# Patient Record
Sex: MALE | Race: WHITE | NOT HISPANIC OR LATINO | URBAN - METROPOLITAN AREA
[De-identification: names, ages, dates, MRNs, and addresses within clinical notes are randomized per-mention and may not be internally consistent; named-entity substitution may affect disease eponyms.]

---

## 2015-03-16 RX ORDER — CLONAZEPAM 1 MG
1 TABLET ORAL
Qty: 0 | Refills: 0 | DISCHARGE
Start: 2015-03-16

## 2015-12-05 RX ORDER — ISOSORBIDE MONONITRATE 60 MG/1
1 TABLET, EXTENDED RELEASE ORAL
Qty: 0 | Refills: 0 | DISCHARGE
Start: 2015-12-05

## 2015-12-05 RX ORDER — INSULIN GLARGINE 100 [IU]/ML
60 INJECTION, SOLUTION SUBCUTANEOUS
Qty: 0 | Refills: 0 | DISCHARGE
Start: 2015-12-05

## 2015-12-05 RX ORDER — INSULIN GLARGINE 100 [IU]/ML
28 INJECTION, SOLUTION SUBCUTANEOUS
Qty: 0 | Refills: 0 | DISCHARGE
Start: 2015-12-05

## 2017-03-13 ENCOUNTER — OUTPATIENT (OUTPATIENT)
Dept: OUTPATIENT SERVICES | Facility: HOSPITAL | Age: 62
LOS: 1 days | End: 2017-03-13
Payer: COMMERCIAL

## 2017-03-13 VITALS
WEIGHT: 235.01 LBS | SYSTOLIC BLOOD PRESSURE: 138 MMHG | RESPIRATION RATE: 16 BRPM | OXYGEN SATURATION: 97 % | HEIGHT: 70 IN | TEMPERATURE: 98 F | HEART RATE: 68 BPM | DIASTOLIC BLOOD PRESSURE: 86 MMHG

## 2017-03-13 DIAGNOSIS — Z95.1 PRESENCE OF AORTOCORONARY BYPASS GRAFT: Chronic | ICD-10-CM

## 2017-03-13 DIAGNOSIS — R93.1 ABNORMAL FINDINGS ON DIAGNOSTIC IMAGING OF HEART AND CORONARY CIRCULATION: ICD-10-CM

## 2017-03-13 DIAGNOSIS — Z95.5 PRESENCE OF CORONARY ANGIOPLASTY IMPLANT AND GRAFT: Chronic | ICD-10-CM

## 2017-03-13 LAB
ALBUMIN SERPL ELPH-MCNC: 4.4 G/DL — SIGNIFICANT CHANGE UP (ref 3.3–5)
ALP SERPL-CCNC: 62 U/L — SIGNIFICANT CHANGE UP (ref 40–120)
ALT FLD-CCNC: 59 U/L RC — HIGH (ref 10–45)
ANION GAP SERPL CALC-SCNC: 13 MMOL/L — SIGNIFICANT CHANGE UP (ref 5–17)
AST SERPL-CCNC: 34 U/L — SIGNIFICANT CHANGE UP (ref 10–40)
BILIRUB SERPL-MCNC: 0.7 MG/DL — SIGNIFICANT CHANGE UP (ref 0.2–1.2)
BUN SERPL-MCNC: 15 MG/DL — SIGNIFICANT CHANGE UP (ref 7–23)
CALCIUM SERPL-MCNC: 9 MG/DL — SIGNIFICANT CHANGE UP (ref 8.4–10.5)
CHLORIDE SERPL-SCNC: 103 MMOL/L — SIGNIFICANT CHANGE UP (ref 96–108)
CO2 SERPL-SCNC: 24 MMOL/L — SIGNIFICANT CHANGE UP (ref 22–31)
CREAT SERPL-MCNC: 0.71 MG/DL — SIGNIFICANT CHANGE UP (ref 0.5–1.3)
GLUCOSE SERPL-MCNC: 197 MG/DL — HIGH (ref 70–99)
HCT VFR BLD CALC: 47.1 % — SIGNIFICANT CHANGE UP (ref 39–50)
HGB BLD-MCNC: 16.5 G/DL — SIGNIFICANT CHANGE UP (ref 13–17)
MCHC RBC-ENTMCNC: 32.4 PG — SIGNIFICANT CHANGE UP (ref 27–34)
MCHC RBC-ENTMCNC: 35 GM/DL — SIGNIFICANT CHANGE UP (ref 32–36)
MCV RBC AUTO: 92.7 FL — SIGNIFICANT CHANGE UP (ref 80–100)
PLATELET # BLD AUTO: 157 K/UL — SIGNIFICANT CHANGE UP (ref 150–400)
POTASSIUM SERPL-MCNC: 4.5 MMOL/L — SIGNIFICANT CHANGE UP (ref 3.5–5.3)
POTASSIUM SERPL-SCNC: 4.5 MMOL/L — SIGNIFICANT CHANGE UP (ref 3.5–5.3)
PROT SERPL-MCNC: 7.4 G/DL — SIGNIFICANT CHANGE UP (ref 6–8.3)
RBC # BLD: 5.09 M/UL — SIGNIFICANT CHANGE UP (ref 4.2–5.8)
RBC # FLD: 12.8 % — SIGNIFICANT CHANGE UP (ref 10.3–14.5)
SODIUM SERPL-SCNC: 140 MMOL/L — SIGNIFICANT CHANGE UP (ref 135–145)
WBC # BLD: 11.9 K/UL — HIGH (ref 3.8–10.5)
WBC # FLD AUTO: 11.9 K/UL — HIGH (ref 3.8–10.5)

## 2017-03-13 PROCEDURE — 93010 ELECTROCARDIOGRAM REPORT: CPT

## 2017-03-13 PROCEDURE — 93455 CORONARY ART/GRFT ANGIO S&I: CPT | Mod: 26,GC

## 2017-03-13 PROCEDURE — 85027 COMPLETE CBC AUTOMATED: CPT

## 2017-03-13 PROCEDURE — C1769: CPT

## 2017-03-13 PROCEDURE — 93005 ELECTROCARDIOGRAM TRACING: CPT

## 2017-03-13 PROCEDURE — 93567 NJX CAR CTH SPRVLV AORTGRPHY: CPT

## 2017-03-13 PROCEDURE — C1887: CPT

## 2017-03-13 PROCEDURE — 93455 CORONARY ART/GRFT ANGIO S&I: CPT

## 2017-03-13 PROCEDURE — 93567 NJX CAR CTH SPRVLV AORTGRPHY: CPT | Mod: GC

## 2017-03-13 PROCEDURE — C1894: CPT

## 2017-03-13 PROCEDURE — 80053 COMPREHEN METABOLIC PANEL: CPT

## 2017-03-13 NOTE — H&P CARDIOLOGY - HISTORY OF PRESENT ILLNESS
61 y/o male with PMHx of CAD, CABG x 2, Stents x 16, HTN, DM type 2, HLD  presents with c/o occasional chest pain & SOB. Patient  reports to have  abnormal stress test ( result not available). Seen & evaluated by cardiologist, Dr. Caputo & recommends for cardiac cath.

## 2017-03-13 NOTE — H&P CARDIOLOGY - PMH
CAD (coronary artery disease)    Diabetes  type 2, controlled, A1c: 8  HLD (hyperlipidemia)    HTN (hypertension)    MI (myocardial infarction)    Stented coronary artery

## 2017-03-13 NOTE — H&P CARDIOLOGY - PSH
S/P CABG x 2  1994 in [place]  S/P CABG x 3  2004, [facility]  S/P drug eluting coronary stent placement  x16

## 2019-05-14 PROBLEM — E11.9 TYPE 2 DIABETES MELLITUS WITHOUT COMPLICATIONS: Chronic | Status: ACTIVE | Noted: 2017-03-13

## 2019-05-15 ENCOUNTER — INPATIENT (INPATIENT)
Facility: HOSPITAL | Age: 64
LOS: 0 days | Discharge: HOME | DRG: 247 | End: 2019-05-16
Attending: INTERNAL MEDICINE | Admitting: INTERNAL MEDICINE
Payer: MEDICARE

## 2019-05-15 ENCOUNTER — TRANSCRIPTION ENCOUNTER (OUTPATIENT)
Age: 64
End: 2019-05-15

## 2019-05-15 VITALS
WEIGHT: 244.93 LBS | HEART RATE: 84 BPM | HEIGHT: 70 IN | TEMPERATURE: 98 F | DIASTOLIC BLOOD PRESSURE: 67 MMHG | SYSTOLIC BLOOD PRESSURE: 118 MMHG | RESPIRATION RATE: 16 BRPM | OXYGEN SATURATION: 97 %

## 2019-05-15 DIAGNOSIS — Z95.1 PRESENCE OF AORTOCORONARY BYPASS GRAFT: Chronic | ICD-10-CM

## 2019-05-15 DIAGNOSIS — Z95.5 PRESENCE OF CORONARY ANGIOPLASTY IMPLANT AND GRAFT: Chronic | ICD-10-CM

## 2019-05-15 DIAGNOSIS — I48.91 UNSPECIFIED ATRIAL FIBRILLATION: ICD-10-CM

## 2019-05-15 LAB
ANION GAP SERPL CALC-SCNC: 14 MMOL/L — SIGNIFICANT CHANGE UP (ref 5–17)
BUN SERPL-MCNC: 23 MG/DL — SIGNIFICANT CHANGE UP (ref 7–23)
CALCIUM SERPL-MCNC: 9.8 MG/DL — SIGNIFICANT CHANGE UP (ref 8.4–10.5)
CHLORIDE SERPL-SCNC: 99 MMOL/L — SIGNIFICANT CHANGE UP (ref 96–108)
CO2 SERPL-SCNC: 22 MMOL/L — SIGNIFICANT CHANGE UP (ref 22–31)
CREAT SERPL-MCNC: 0.9 MG/DL — SIGNIFICANT CHANGE UP (ref 0.5–1.3)
GLUCOSE BLDC GLUCOMTR-MCNC: 114 MG/DL — HIGH (ref 70–99)
GLUCOSE BLDC GLUCOMTR-MCNC: 133 MG/DL — HIGH (ref 70–99)
GLUCOSE BLDC GLUCOMTR-MCNC: 207 MG/DL — HIGH (ref 70–99)
GLUCOSE SERPL-MCNC: 164 MG/DL — HIGH (ref 70–99)
HBA1C BLD-MCNC: 7.5 % — HIGH (ref 4–5.6)
HCT VFR BLD CALC: 41.4 % — SIGNIFICANT CHANGE UP (ref 39–50)
HGB BLD-MCNC: 14.3 G/DL — SIGNIFICANT CHANGE UP (ref 13–17)
MCHC RBC-ENTMCNC: 32.9 PG — SIGNIFICANT CHANGE UP (ref 27–34)
MCHC RBC-ENTMCNC: 34.6 GM/DL — SIGNIFICANT CHANGE UP (ref 32–36)
MCV RBC AUTO: 95 FL — SIGNIFICANT CHANGE UP (ref 80–100)
PLATELET # BLD AUTO: 183 K/UL — SIGNIFICANT CHANGE UP (ref 150–400)
POTASSIUM SERPL-MCNC: 4.2 MMOL/L — SIGNIFICANT CHANGE UP (ref 3.5–5.3)
POTASSIUM SERPL-SCNC: 4.2 MMOL/L — SIGNIFICANT CHANGE UP (ref 3.5–5.3)
RBC # BLD: 4.36 M/UL — SIGNIFICANT CHANGE UP (ref 4.2–5.8)
RBC # FLD: 13.5 % — SIGNIFICANT CHANGE UP (ref 10.3–14.5)
SODIUM SERPL-SCNC: 135 MMOL/L — SIGNIFICANT CHANGE UP (ref 135–145)
WBC # BLD: 8.7 K/UL — SIGNIFICANT CHANGE UP (ref 3.8–10.5)
WBC # FLD AUTO: 8.7 K/UL — SIGNIFICANT CHANGE UP (ref 3.8–10.5)

## 2019-05-15 PROCEDURE — 93567 NJX CAR CTH SPRVLV AORTGRPHY: CPT | Mod: GC

## 2019-05-15 PROCEDURE — 93456 R HRT CORONARY ARTERY ANGIO: CPT | Mod: 26,59,GC

## 2019-05-15 PROCEDURE — 99152 MOD SED SAME PHYS/QHP 5/>YRS: CPT | Mod: GC

## 2019-05-15 PROCEDURE — 93010 ELECTROCARDIOGRAM REPORT: CPT

## 2019-05-15 PROCEDURE — 92928 PRQ TCAT PLMT NTRAC ST 1 LES: CPT | Mod: LM,GC

## 2019-05-15 PROCEDURE — 93010 ELECTROCARDIOGRAM REPORT: CPT | Mod: 77

## 2019-05-15 RX ORDER — APIXABAN 2.5 MG/1
5 TABLET, FILM COATED ORAL EVERY 12 HOURS
Refills: 0 | Status: DISCONTINUED | OUTPATIENT
Start: 2019-05-16 | End: 2019-05-16

## 2019-05-15 RX ORDER — INSULIN LISPRO 100/ML
VIAL (ML) SUBCUTANEOUS
Refills: 0 | Status: DISCONTINUED | OUTPATIENT
Start: 2019-05-15 | End: 2019-05-16

## 2019-05-15 RX ORDER — DEXTROSE 50 % IN WATER 50 %
12.5 SYRINGE (ML) INTRAVENOUS ONCE
Refills: 0 | Status: DISCONTINUED | OUTPATIENT
Start: 2019-05-15 | End: 2019-05-16

## 2019-05-15 RX ORDER — INSULIN LISPRO 100/ML
5 VIAL (ML) SUBCUTANEOUS
Refills: 0 | Status: DISCONTINUED | OUTPATIENT
Start: 2019-05-15 | End: 2019-05-16

## 2019-05-15 RX ORDER — DEXTROSE 50 % IN WATER 50 %
25 SYRINGE (ML) INTRAVENOUS ONCE
Refills: 0 | Status: DISCONTINUED | OUTPATIENT
Start: 2019-05-15 | End: 2019-05-16

## 2019-05-15 RX ORDER — INSULIN LISPRO 100/ML
VIAL (ML) SUBCUTANEOUS AT BEDTIME
Refills: 0 | Status: DISCONTINUED | OUTPATIENT
Start: 2019-05-15 | End: 2019-05-16

## 2019-05-15 RX ORDER — APIXABAN 2.5 MG/1
1 TABLET, FILM COATED ORAL
Qty: 0 | Refills: 0 | DISCHARGE
Start: 2019-05-15

## 2019-05-15 RX ORDER — GLUCAGON INJECTION, SOLUTION 0.5 MG/.1ML
1 INJECTION, SOLUTION SUBCUTANEOUS ONCE
Refills: 0 | Status: DISCONTINUED | OUTPATIENT
Start: 2019-05-15 | End: 2019-05-16

## 2019-05-15 RX ORDER — ASPIRIN/CALCIUM CARB/MAGNESIUM 324 MG
81 TABLET ORAL DAILY
Refills: 0 | Status: DISCONTINUED | OUTPATIENT
Start: 2019-05-15 | End: 2019-05-16

## 2019-05-15 RX ORDER — METFORMIN HYDROCHLORIDE 850 MG/1
1 TABLET ORAL
Qty: 0 | Refills: 0 | DISCHARGE

## 2019-05-15 RX ORDER — DILTIAZEM HCL 120 MG
120 CAPSULE, EXT RELEASE 24 HR ORAL DAILY
Refills: 0 | Status: DISCONTINUED | OUTPATIENT
Start: 2019-05-15 | End: 2019-05-16

## 2019-05-15 RX ORDER — DEXTROSE 50 % IN WATER 50 %
15 SYRINGE (ML) INTRAVENOUS ONCE
Refills: 0 | Status: DISCONTINUED | OUTPATIENT
Start: 2019-05-15 | End: 2019-05-16

## 2019-05-15 RX ORDER — POTASSIUM CHLORIDE 20 MEQ
20 PACKET (EA) ORAL DAILY
Refills: 0 | Status: DISCONTINUED | OUTPATIENT
Start: 2019-05-15 | End: 2019-05-16

## 2019-05-15 RX ORDER — SODIUM CHLORIDE 9 MG/ML
1000 INJECTION, SOLUTION INTRAVENOUS
Refills: 0 | Status: DISCONTINUED | OUTPATIENT
Start: 2019-05-15 | End: 2019-05-16

## 2019-05-15 RX ORDER — FENOFIBRATE,MICRONIZED 130 MG
145 CAPSULE ORAL DAILY
Refills: 0 | Status: DISCONTINUED | OUTPATIENT
Start: 2019-05-15 | End: 2019-05-16

## 2019-05-15 RX ORDER — CLOPIDOGREL BISULFATE 75 MG/1
75 TABLET, FILM COATED ORAL DAILY
Refills: 0 | Status: DISCONTINUED | OUTPATIENT
Start: 2019-05-16 | End: 2019-05-16

## 2019-05-15 RX ORDER — ATENOLOL 25 MG/1
25 TABLET ORAL DAILY
Refills: 0 | Status: DISCONTINUED | OUTPATIENT
Start: 2019-05-15 | End: 2019-05-16

## 2019-05-15 RX ORDER — LISINOPRIL 2.5 MG/1
10 TABLET ORAL DAILY
Refills: 0 | Status: DISCONTINUED | OUTPATIENT
Start: 2019-05-15 | End: 2019-05-16

## 2019-05-15 RX ORDER — CLONAZEPAM 1 MG
1 TABLET ORAL THREE TIMES A DAY
Refills: 0 | Status: DISCONTINUED | OUTPATIENT
Start: 2019-05-15 | End: 2019-05-16

## 2019-05-15 RX ORDER — SERTRALINE 25 MG/1
100 TABLET, FILM COATED ORAL DAILY
Refills: 0 | Status: DISCONTINUED | OUTPATIENT
Start: 2019-05-15 | End: 2019-05-16

## 2019-05-15 RX ORDER — NITROGLYCERIN 6.5 MG
1 CAPSULE, EXTENDED RELEASE ORAL
Qty: 0 | Refills: 0 | DISCHARGE

## 2019-05-15 RX ORDER — FUROSEMIDE 40 MG
40 TABLET ORAL DAILY
Refills: 0 | Status: DISCONTINUED | OUTPATIENT
Start: 2019-05-15 | End: 2019-05-16

## 2019-05-15 RX ORDER — INSULIN GLARGINE 100 [IU]/ML
48 INJECTION, SOLUTION SUBCUTANEOUS AT BEDTIME
Refills: 0 | Status: DISCONTINUED | OUTPATIENT
Start: 2019-05-15 | End: 2019-05-16

## 2019-05-15 RX ORDER — ATORVASTATIN CALCIUM 80 MG/1
80 TABLET, FILM COATED ORAL AT BEDTIME
Refills: 0 | Status: DISCONTINUED | OUTPATIENT
Start: 2019-05-15 | End: 2019-05-16

## 2019-05-15 RX ORDER — ISOSORBIDE MONONITRATE 60 MG/1
60 TABLET, EXTENDED RELEASE ORAL DAILY
Refills: 0 | Status: DISCONTINUED | OUTPATIENT
Start: 2019-05-15 | End: 2019-05-16

## 2019-05-15 RX ADMIN — Medication 1 MILLIGRAM(S): at 21:34

## 2019-05-15 RX ADMIN — INSULIN GLARGINE 48 UNIT(S): 100 INJECTION, SOLUTION SUBCUTANEOUS at 21:31

## 2019-05-15 RX ADMIN — SERTRALINE 100 MILLIGRAM(S): 25 TABLET, FILM COATED ORAL at 18:14

## 2019-05-15 RX ADMIN — ATORVASTATIN CALCIUM 80 MILLIGRAM(S): 80 TABLET, FILM COATED ORAL at 21:31

## 2019-05-15 RX ADMIN — Medication 120 MILLIGRAM(S): at 18:14

## 2019-05-15 RX ADMIN — Medication 5 UNIT(S): at 18:15

## 2019-05-15 RX ADMIN — Medication 145 MILLIGRAM(S): at 18:14

## 2019-05-15 NOTE — H&P CARDIOLOGY - HISTORY OF PRESENT ILLNESS
64 year old  male with pmhx of CAD with PCI x 16, double Coronary Bypass (1998, then triple Coronary Bypass ( 2004), prior MI, Afib on Eliquis (last taken 5/14/19), HTN, HLD FALGUNI on CPAP, and Type 2 DM presents with typical angina. The patient admits to experiencing midsternal chest pain at rest, worsening upon minimal exertion for the past 6 weeks. He states he has taken 4-6 sublingual nitro which did provide relief of the chest pain. He was referred for cath by his cardiologist Dr. Caputo. He currently denies syncope, dizziness, shortness of breath or palpitations.

## 2019-05-15 NOTE — CHART NOTE - NSCHARTNOTEFT_GEN_A_CORE
Patient underwent a PCI procedure and is being admitted as they are at increased risk for major adverse cardiac and vascular events if discharged due to the following high risk characteristics:      unstable angina    Pee Bolton, NP   x [phone]

## 2019-05-15 NOTE — H&P CARDIOLOGY - FAMILY HISTORY
Sibling  Still living? Yes, Estimated age: Age Unknown  Family history of CHF (congestive heart failure), Age at diagnosis: Age Unknown  Family history of hypertension, Age at diagnosis: Age Unknown     Child  Still living? Yes, Estimated age: Age Unknown  Family history of hypertension, Age at diagnosis: Age Unknown     Grandparent  Still living? No  Family history of CHF (congestive heart failure), Age at diagnosis: Age Unknown

## 2019-05-15 NOTE — CHART NOTE - NSCHARTNOTEFT_GEN_A_CORE
FEMORAL ANGIOSEAL ASSESSMENT NOTE    Right groin angioseal in place with good hemostatsis w/o any bleeding or hematoma  Pulses in the RIGHT lower extremity are palpable, ( right femoral/DP/PT pulses +2).  Right groin is soft/non tender,  Patient denies leg or foot pain, numbness, tingling,  or chest pain  post 12 lead EKG WNL, no ST or T wave changes noted when compared to pre procedural EKG    Complications: None    Comments: Bedrest for the next 2 hours as per protocol     Education: medication adherence, groin care, signs and symptoms of groin complications reviewed with pt who verbalizes understanding. All questions answered.       Pee Bolton, NP  x [phone]

## 2019-05-15 NOTE — DISCHARGE NOTE PROVIDER - NSDCCPTREATMENT_GEN_ALL_CORE_FT
PRINCIPAL PROCEDURE  Procedure: Left heart catheterization  Findings and Treatment: s/p LHC NORRIS x 1 LM (80%), pLAd , SVG to RPDA patent; SVG to  OM patent

## 2019-05-15 NOTE — H&P CARDIOLOGY - PMH
CAD (coronary artery disease)    Diabetes  type 2, controlled, A1c: 8  HLD (hyperlipidemia)    HTN (hypertension)    MI (myocardial infarction)    Sleep apnea, unspecified type    Stented coronary artery
English

## 2019-05-15 NOTE — DISCHARGE NOTE PROVIDER - CARE PROVIDER_API CALL
Eleno Wilson (MD)  Cardiovascular Disease; Internal Medicine  [address] [place] [address]  Phone: [phone]  Fax: [phone]  Follow Up Time:

## 2019-05-15 NOTE — DISCHARGE NOTE PROVIDER - NSDCCPCAREPLAN_GEN_ALL_CORE_FT
PRINCIPAL DISCHARGE DIAGNOSIS  Diagnosis: CAD (coronary artery disease)  Assessment and Plan of Treatment: Pt remains chest pain free and understands post cath discharge instructions   No heavy lifting, strenuous activity, bending, straining or unnecessary stair climbing  for 2 weeks. No sex for 1 week.  No driving for 2 days. You may shower 24 hours following procedure but avoid baths and swimming for 1 week. Check groin site for bleeding and/or swelling daily following procedure. Call your doctor/cardiologist immediately should it occur or if you have increased/persistent pain at the site. Follow up with your cardiologist in 1- 2 weeks. You may call [facility] at [phone] or [phone] after office hours and weekends  with any questions or concerns following your procedure. Take medications as prescribed.

## 2019-05-15 NOTE — CHART NOTE - NSCHARTNOTEFT_GEN_A_CORE
TRIPLE THERAPY    Pt to take ASA/PLAVIX/ELIQUIS x 1 month, then ASPIRIN to be stopped  and Eliquis and Plavix to continue as discussed with Dr Dyer.    Pee Bolton, NP  x  [phone]

## 2019-05-15 NOTE — DISCHARGE NOTE PROVIDER - NSDCFUADDINST_GEN_ALL_CORE_FT
Aspiri, PLAVIX and ELIQUIS x 1 month  After one months discontinue  aspirin   Continue with Plavix and Eliquis

## 2019-05-15 NOTE — DISCHARGE NOTE PROVIDER - HOSPITAL COURSE
64 year old  male with pmhx of CAD with PCI x 16, double Coronary Bypass (1998, then triple Coronary Bypass ( 2004), prior MI, Afib on Eliquis (last taken 5/14/19), HTN, HLD FALGUNI on CPAP, and Type 2 DM presents with typical angina. The patient admits to experiencing midsternal chest pain at rest, worsening upon minimal exertion for the past 6 weeks. He states he has taken 4-6 sublingual nitro which did provide relief of the chest pain. He was referred for cath by his cardiologist Dr. Caputo. He currently denies syncope, dizziness, shortness of breath or palpitations. Pt is now slp [facility] NORRIS x 1 LM  (80%) via RFA, SVG-RPDA patent, pLAD , SVG to OM patent

## 2019-05-16 ENCOUNTER — TRANSCRIPTION ENCOUNTER (OUTPATIENT)
Age: 64
End: 2019-05-16

## 2019-05-16 VITALS
OXYGEN SATURATION: 98 % | RESPIRATION RATE: 18 BRPM | HEART RATE: 78 BPM | SYSTOLIC BLOOD PRESSURE: 103 MMHG | DIASTOLIC BLOOD PRESSURE: 78 MMHG

## 2019-05-16 LAB
ANION GAP SERPL CALC-SCNC: 12 MMOL/L — SIGNIFICANT CHANGE UP (ref 5–17)
BUN SERPL-MCNC: 22 MG/DL — SIGNIFICANT CHANGE UP (ref 7–23)
CALCIUM SERPL-MCNC: 9.3 MG/DL — SIGNIFICANT CHANGE UP (ref 8.4–10.5)
CHLORIDE SERPL-SCNC: 106 MMOL/L — SIGNIFICANT CHANGE UP (ref 96–108)
CO2 SERPL-SCNC: 19 MMOL/L — LOW (ref 22–31)
CREAT SERPL-MCNC: 0.82 MG/DL — SIGNIFICANT CHANGE UP (ref 0.5–1.3)
GLUCOSE SERPL-MCNC: 176 MG/DL — HIGH (ref 70–99)
HCT VFR BLD CALC: 39.6 % — SIGNIFICANT CHANGE UP (ref 39–50)
HGB BLD-MCNC: 13.7 G/DL — SIGNIFICANT CHANGE UP (ref 13–17)
MCHC RBC-ENTMCNC: 32.8 PG — SIGNIFICANT CHANGE UP (ref 27–34)
MCHC RBC-ENTMCNC: 34.6 GM/DL — SIGNIFICANT CHANGE UP (ref 32–36)
MCV RBC AUTO: 94.9 FL — SIGNIFICANT CHANGE UP (ref 80–100)
PLATELET # BLD AUTO: 167 K/UL — SIGNIFICANT CHANGE UP (ref 150–400)
POTASSIUM SERPL-MCNC: 4 MMOL/L — SIGNIFICANT CHANGE UP (ref 3.5–5.3)
POTASSIUM SERPL-SCNC: 4 MMOL/L — SIGNIFICANT CHANGE UP (ref 3.5–5.3)
RBC # BLD: 4.17 M/UL — LOW (ref 4.2–5.8)
RBC # FLD: 13.6 % — SIGNIFICANT CHANGE UP (ref 10.3–14.5)
SODIUM SERPL-SCNC: 137 MMOL/L — SIGNIFICANT CHANGE UP (ref 135–145)
WBC # BLD: 8.7 K/UL — SIGNIFICANT CHANGE UP (ref 3.8–10.5)
WBC # FLD AUTO: 8.7 K/UL — SIGNIFICANT CHANGE UP (ref 3.8–10.5)

## 2019-05-16 PROCEDURE — 93005 ELECTROCARDIOGRAM TRACING: CPT

## 2019-05-16 PROCEDURE — C1769: CPT

## 2019-05-16 PROCEDURE — 93567 NJX CAR CTH SPRVLV AORTGRPHY: CPT

## 2019-05-16 PROCEDURE — 82962 GLUCOSE BLOOD TEST: CPT

## 2019-05-16 PROCEDURE — 93456 R HRT CORONARY ARTERY ANGIO: CPT | Mod: 59

## 2019-05-16 PROCEDURE — 99153 MOD SED SAME PHYS/QHP EA: CPT

## 2019-05-16 PROCEDURE — 85027 COMPLETE CBC AUTOMATED: CPT

## 2019-05-16 PROCEDURE — 83036 HEMOGLOBIN GLYCOSYLATED A1C: CPT

## 2019-05-16 PROCEDURE — 99238 HOSP IP/OBS DSCHRG MGMT 30/<: CPT

## 2019-05-16 PROCEDURE — 80048 BASIC METABOLIC PNL TOTAL CA: CPT

## 2019-05-16 PROCEDURE — C1725: CPT

## 2019-05-16 PROCEDURE — C1894: CPT

## 2019-05-16 PROCEDURE — C1887: CPT

## 2019-05-16 PROCEDURE — C1760: CPT

## 2019-05-16 PROCEDURE — C9600: CPT | Mod: LM

## 2019-05-16 PROCEDURE — 99152 MOD SED SAME PHYS/QHP 5/>YRS: CPT

## 2019-05-16 PROCEDURE — C1874: CPT

## 2019-05-16 RX ORDER — CLOPIDOGREL BISULFATE 75 MG/1
1 TABLET, FILM COATED ORAL
Qty: 30 | Refills: 11
Start: 2019-05-16 | End: 2020-05-09

## 2019-05-16 RX ADMIN — CLOPIDOGREL BISULFATE 75 MILLIGRAM(S): 75 TABLET, FILM COATED ORAL at 05:42

## 2019-05-16 RX ADMIN — Medication 81 MILLIGRAM(S): at 05:42

## 2019-05-16 RX ADMIN — APIXABAN 5 MILLIGRAM(S): 2.5 TABLET, FILM COATED ORAL at 05:43

## 2019-05-16 NOTE — PROGRESS NOTE ADULT - SUBJECTIVE AND OBJECTIVE BOX
64y old  Male who presents with a chief complaint of CAD (15 May 2019 20:20)now s/p C LM NORRIS x 1 (80%); SVG to RPDA patent; SVG to OM patent, pLAD   via RFA access            Allergies    morphine (Rash)    Intolerances        Medications:  apixaban 5 milliGRAM(s) Oral every 12 hours  aspirin enteric coated 81 milliGRAM(s) Oral daily  ATENolol  Tablet 25 milliGRAM(s) Oral daily  atorvastatin 80 milliGRAM(s) Oral at bedtime  clonazePAM  Tablet 1 milliGRAM(s) Oral three times a day PRN  clopidogrel Tablet 75 milliGRAM(s) Oral daily  dextrose 40% Gel 15 Gram(s) Oral once PRN  dextrose 5%. 1000 milliLiter(s) IV Continuous <Continuous>  dextrose 50% Injectable 12.5 Gram(s) IV Push once  dextrose 50% Injectable 25 Gram(s) IV Push once  dextrose 50% Injectable 25 Gram(s) IV Push once  diltiazem    milliGRAM(s) Oral daily  fenofibrate Tablet 145 milliGRAM(s) Oral daily  furosemide    Tablet 40 milliGRAM(s) Oral daily  glucagon  Injectable 1 milliGRAM(s) IntraMuscular once PRN  insulin glargine Injectable (LANTUS) 48 Unit(s) SubCutaneous at bedtime  insulin lispro (HumaLOG) corrective regimen sliding scale   SubCutaneous three times a day before meals  insulin lispro (HumaLOG) corrective regimen sliding scale   SubCutaneous at bedtime  insulin lispro Injectable (HumaLOG) 5 Unit(s) SubCutaneous three times a day before meals  isosorbide   mononitrate ER Tablet (IMDUR) 60 milliGRAM(s) Oral daily  lisinopril 10 milliGRAM(s) Oral daily  potassium chloride    Tablet ER 20 milliEquivalent(s) Oral daily  sertraline 100 milliGRAM(s) Oral daily      Vitals:  T(C): 36.5 (05-15-19 @ 19:55), Max: 37 (05-15-19 @ 14:00)  HR: 82 (05-15-19 @ 19:55) (71 - 91)  BP: 102/65 (05-15-19 @ 19:55) (87/68 - 123/80)  BP(mean): 84 (05-15-19 @ 11:20) (84 - 84)  RR: 18 (05-15-19 @ 19:55) (16 - 18)  SpO2: 98% (05-15-19 @ 19:55) (95% - 98%)  Wt(kg): --  Daily Height in cm: 177.8 (15 May 2019 11:20)    Daily Weight in k.1 (15 May 2019 11:20)  I&O's Summary        Physical Exam:  Appearance: Normal  Procedural Access Site: RFA access. No hematoma, Non-tender to palpation, 2+ pulse, No bruit, No Ecchymosis  Psychiatry: AAOx3, Mood & affect appropriate  Skin: No rashes, No ecchymoses, No cyanosis    05-15    135  |  99  |  23  ----------------------------<  164<H>  4.2   |  22  |  0.90    Ca    9.8      15 May 2019 11:10      Interpretation of Telemetry:

## 2019-05-16 NOTE — CONSULT NOTE ADULT - SUBJECTIVE AND OBJECTIVE BOX
CHIEF COMPLAINT: chest pain    HISTORY OF PRESENT ILLNESS:  64 year old  male with pmhx of CAD with PCI x 16, double Coronary Bypass (1998, then triple Coronary Bypass ( 2004), prior MI, Afib on Eliquis (last taken 5/14/19), HTN, HLD FALGUNI on CPAP, and Type 2 DM presents with typical angina. The patient admits to experiencing midsternal chest pain at rest, worsening upon minimal exertion for the past 6 weeks. He states he has taken 4-6 sublingual nitro which did provide relief of the chest pain. He was referred for cath by his cardiologist Dr. Caputo. He currently denies syncope, dizziness, shortness of breath or palpitations.      Allergies  morphine (Rash)        MEDICATIONS:  apixaban 5 milliGRAM(s) Oral every 12 hours  aspirin enteric coated 81 milliGRAM(s) Oral daily  ATENolol  Tablet 25 milliGRAM(s) Oral daily  clopidogrel Tablet 75 milliGRAM(s) Oral daily  diltiazem    milliGRAM(s) Oral daily  furosemide    Tablet 40 milliGRAM(s) Oral daily  isosorbide   mononitrate ER Tablet (IMDUR) 60 milliGRAM(s) Oral daily  lisinopril 10 milliGRAM(s) Oral daily  clonazePAM  Tablet 1 milliGRAM(s) Oral three times a day PRN  sertraline 100 milliGRAM(s) Oral daily  atorvastatin 80 milliGRAM(s) Oral at bedtime  dextrose 40% Gel 15 Gram(s) Oral once PRN  dextrose 50% Injectable 12.5 Gram(s) IV Push once  dextrose 50% Injectable 25 Gram(s) IV Push once  dextrose 50% Injectable 25 Gram(s) IV Push once  fenofibrate Tablet 145 milliGRAM(s) Oral daily  glucagon  Injectable 1 milliGRAM(s) IntraMuscular once PRN  insulin glargine Injectable (LANTUS) 48 Unit(s) SubCutaneous at bedtime  insulin lispro (HumaLOG) corrective regimen sliding scale   SubCutaneous three times a day before meals  insulin lispro (HumaLOG) corrective regimen sliding scale   SubCutaneous at bedtime  insulin lispro Injectable (HumaLOG) 5 Unit(s) SubCutaneous three times a day before meals  dextrose 5%. 1000 milliLiter(s) IV Continuous <Continuous>  potassium chloride    Tablet ER 20 milliEquivalent(s) Oral daily      PAST MEDICAL & SURGICAL HISTORY:  Sleep apnea, unspecified type  Diabetes: type 2, controlled, A1c: 8  HLD (hyperlipidemia)  HTN (hypertension)  Stented coronary artery  CAD (coronary artery disease)  MI (myocardial infarction)  S/P drug eluting coronary stent placement: x16  S/P CABG x 3: 2004, [facility]  S/P CABG x 2: 1994 in [place]      FAMILY HISTORY:  Family history of CHF (congestive heart failure) (Grandparent)  Family history of hypertension (Sibling, Child)  Family history of CHF (congestive heart failure) (Sibling)      SOCIAL HISTORY:    nonsmoker. independent in adl    REVIEW OF SYSTEMS:  See HPI, otherwise complete 10 point review of systems negative    [ ] All others negative	      PHYSICAL EXAM:  T(C): 36.5 (05-16-19 @ 04:23), Max: 37 (05-15-19 @ 14:00)  HR: 78 (05-16-19 @ 06:59) (71 - 91)  BP: 103/78 (05-16-19 @ 06:59) (87/68 - 123/80)  RR: 18 (05-16-19 @ 06:59) (16 - 18)  SpO2: 98% (05-16-19 @ 06:59) (95% - 98%)  Wt(kg): --  I&O's Summary    15 May 2019 07:01  -  16 May 2019 07:00  --------------------------------------------------------  IN: 180 mL / OUT: 0 mL / NET: 180 mL        Appearance: No Acute Distress	  HEENT:  Normal oral mucosa, PERRL, EOMI	  Cardiovascular: Normal S1 S2, No JVD, No murmurs/rubs/gallops  Respiratory: Lungs clear to auscultation bilaterally  Gastrointestinal:  Soft, Non-tender, + BS	  Skin: No rashes, No ecchymoses, No cyanosis	  Neurologic: Non-focal  Extremities: No clubbing, cyanosis or edema  Vascular: Peripheral pulses palpable 2+ bilaterally  Psychiatry: A & O x 3, Mood & affect appropriate    Laboratory Data:	 	    CBC Full  -  ( 16 May 2019 02:45 )  WBC Count : 8.7 K/uL  Hemoglobin : 13.7 g/dL  Hematocrit : 39.6 %  Platelet Count - Automated : 167 K/uL  Mean Cell Volume : 94.9 fl  Mean Cell Hemoglobin : 32.8 pg  Mean Cell Hemoglobin Concentration : 34.6 gm/dL  Auto Neutrophil # : x  Auto Lymphocyte # : x  Auto Monocyte # : x  Auto Eosinophil # : x  Auto Basophil # : x  Auto Neutrophil % : x  Auto Lymphocyte % : x  Auto Monocyte % : x  Auto Eosinophil % : x  Auto Basophil % : x    05-16    137  |  106  |  22  ----------------------------<  176<H>  4.0   |  19<L>  |  0.82  05-15    135  |  99  |  23  ----------------------------<  164<H>  4.2   |  22  |  0.90    Ca    9.3      16 May 2019 02:45  Ca    9.8      15 May 2019 11:10        proBNP:   Lipid Profile:   HgA1c: Hemoglobin A1C, Whole Blood: 7.5 % (05-15 @ 16:53)        Interpretation of Telemetry: 	af rate controlled    ECG:  	af 70s no acute ischemic changes    C:  5/15/19: LM NORRIS x 1 (80%)  SVG to RPDA patent   pLAD  SVG to OM patent  right fem artery angioseal        Assessment:  -atrial fibrillation  -CAD s/p CABG  -unstable angina  -ischemic cardiomyopathy  -DM  -FALGUNI on CPAP    Recs:  -appreciate excellent care under dr jamila cohen  -c/w dapt and statin. per discussion with dr cohen, would hold asa after 1 mo to avoid triple therapy. after 3 months would transition from plavix to asa per dr cohen recs  -c/w eliquis and dilt for afib  -c/w lasix. appears euvolemic  -dispo planning            Greater than 60 minutes spent on total encounter; more than 50% of the visit was spent counseling and/or coordinating care by the attending physician.   	  Zander Wilson MD   Cardiovascular Diseases  [phone]

## 2019-05-16 NOTE — DISCHARGE NOTE NURSING/CASE MANAGEMENT/SOCIAL WORK - NSDCDPATPORTLINK_GEN_ALL_CORE
You can access the [facility], offered by [facility], by registering with the following website: [email or web address]

## 2019-05-16 NOTE — PROGRESS NOTE ADULT - ASSESSMENT
64y old  Male who presents with a chief complaint of CAD (15 May 2019 20:20)now s/p [facility] LM NORRIS x 1 (80%); SVG to RPDA patent; SVG to OM patent, pLAD   via RFA access.  Pt tolerated the procedure well, cardiac cath site benign. Post-procedure discharge instructions discussed and questions addressed

## 2019-06-06 ENCOUNTER — TRANSCRIPTION ENCOUNTER (OUTPATIENT)
Age: 64
End: 2019-06-06

## 2019-08-22 PROBLEM — G47.30 SLEEP APNEA, UNSPECIFIED: Chronic | Status: ACTIVE | Noted: 2019-05-15

## 2019-08-23 ENCOUNTER — OUTPATIENT (OUTPATIENT)
Dept: OUTPATIENT SERVICES | Facility: HOSPITAL | Age: 64
LOS: 1 days | End: 2019-08-23
Payer: MEDICARE

## 2019-08-23 VITALS
RESPIRATION RATE: 18 BRPM | HEIGHT: 70 IN | WEIGHT: 240.08 LBS | DIASTOLIC BLOOD PRESSURE: 72 MMHG | SYSTOLIC BLOOD PRESSURE: 131 MMHG | OXYGEN SATURATION: 96 % | TEMPERATURE: 98 F | HEART RATE: 86 BPM

## 2019-08-23 DIAGNOSIS — R94.39 ABNORMAL RESULT OF OTHER CARDIOVASCULAR FUNCTION STUDY: ICD-10-CM

## 2019-08-23 DIAGNOSIS — Z95.5 PRESENCE OF CORONARY ANGIOPLASTY IMPLANT AND GRAFT: Chronic | ICD-10-CM

## 2019-08-23 DIAGNOSIS — Z95.1 PRESENCE OF AORTOCORONARY BYPASS GRAFT: Chronic | ICD-10-CM

## 2019-08-23 LAB
ALBUMIN SERPL ELPH-MCNC: 4.5 G/DL — SIGNIFICANT CHANGE UP (ref 3.3–5)
ALP SERPL-CCNC: 45 U/L — SIGNIFICANT CHANGE UP (ref 40–120)
ALT FLD-CCNC: 26 U/L — SIGNIFICANT CHANGE UP (ref 10–45)
ANION GAP SERPL CALC-SCNC: 12 MMOL/L — SIGNIFICANT CHANGE UP (ref 5–17)
AST SERPL-CCNC: 21 U/L — SIGNIFICANT CHANGE UP (ref 10–40)
BILIRUB SERPL-MCNC: 0.6 MG/DL — SIGNIFICANT CHANGE UP (ref 0.2–1.2)
BUN SERPL-MCNC: 24 MG/DL — HIGH (ref 7–23)
CALCIUM SERPL-MCNC: 10 MG/DL — SIGNIFICANT CHANGE UP (ref 8.4–10.5)
CHLORIDE SERPL-SCNC: 103 MMOL/L — SIGNIFICANT CHANGE UP (ref 96–108)
CO2 SERPL-SCNC: 22 MMOL/L — SIGNIFICANT CHANGE UP (ref 22–31)
CREAT SERPL-MCNC: 0.92 MG/DL — SIGNIFICANT CHANGE UP (ref 0.5–1.3)
GLUCOSE BLDC GLUCOMTR-MCNC: 115 MG/DL — HIGH (ref 70–99)
GLUCOSE SERPL-MCNC: 164 MG/DL — HIGH (ref 70–99)
HCT VFR BLD CALC: 45.8 % — SIGNIFICANT CHANGE UP (ref 39–50)
HGB BLD-MCNC: 14.8 G/DL — SIGNIFICANT CHANGE UP (ref 13–17)
MCHC RBC-ENTMCNC: 30.1 PG — SIGNIFICANT CHANGE UP (ref 27–34)
MCHC RBC-ENTMCNC: 32.4 GM/DL — SIGNIFICANT CHANGE UP (ref 32–36)
MCV RBC AUTO: 92.9 FL — SIGNIFICANT CHANGE UP (ref 80–100)
PLATELET # BLD AUTO: 151 K/UL — SIGNIFICANT CHANGE UP (ref 150–400)
POTASSIUM SERPL-MCNC: 4.3 MMOL/L — SIGNIFICANT CHANGE UP (ref 3.5–5.3)
POTASSIUM SERPL-SCNC: 4.3 MMOL/L — SIGNIFICANT CHANGE UP (ref 3.5–5.3)
PROT SERPL-MCNC: 8 G/DL — SIGNIFICANT CHANGE UP (ref 6–8.3)
RBC # BLD: 4.93 M/UL — SIGNIFICANT CHANGE UP (ref 4.2–5.8)
RBC # FLD: 13.5 % — SIGNIFICANT CHANGE UP (ref 10.3–14.5)
SODIUM SERPL-SCNC: 137 MMOL/L — SIGNIFICANT CHANGE UP (ref 135–145)
WBC # BLD: 8.3 K/UL — SIGNIFICANT CHANGE UP (ref 3.8–10.5)
WBC # FLD AUTO: 8.3 K/UL — SIGNIFICANT CHANGE UP (ref 3.8–10.5)

## 2019-08-23 PROCEDURE — C1894: CPT

## 2019-08-23 PROCEDURE — 99152 MOD SED SAME PHYS/QHP 5/>YRS: CPT

## 2019-08-23 PROCEDURE — 82962 GLUCOSE BLOOD TEST: CPT

## 2019-08-23 PROCEDURE — 93455 CORONARY ART/GRFT ANGIO S&I: CPT

## 2019-08-23 PROCEDURE — 93567 NJX CAR CTH SPRVLV AORTGRPHY: CPT

## 2019-08-23 PROCEDURE — 93005 ELECTROCARDIOGRAM TRACING: CPT

## 2019-08-23 PROCEDURE — 85027 COMPLETE CBC AUTOMATED: CPT

## 2019-08-23 PROCEDURE — 93567 NJX CAR CTH SPRVLV AORTGRPHY: CPT | Mod: GC

## 2019-08-23 PROCEDURE — 93455 CORONARY ART/GRFT ANGIO S&I: CPT | Mod: 26,GC

## 2019-08-23 PROCEDURE — 93010 ELECTROCARDIOGRAM REPORT: CPT

## 2019-08-23 PROCEDURE — C1887: CPT

## 2019-08-23 PROCEDURE — 80053 COMPREHEN METABOLIC PANEL: CPT

## 2019-08-23 PROCEDURE — C1760: CPT

## 2019-08-23 PROCEDURE — 99152 MOD SED SAME PHYS/QHP 5/>YRS: CPT | Mod: GC

## 2019-08-23 PROCEDURE — C1769: CPT

## 2019-08-23 RX ORDER — DULAGLUTIDE 4.5 MG/.5ML
1 INJECTION, SOLUTION SUBCUTANEOUS
Qty: 0 | Refills: 0 | DISCHARGE

## 2019-08-23 RX ORDER — ATENOLOL 25 MG/1
1 TABLET ORAL
Qty: 0 | Refills: 0 | DISCHARGE

## 2019-08-23 RX ORDER — DILTIAZEM HCL 120 MG
1 CAPSULE, EXT RELEASE 24 HR ORAL
Qty: 0 | Refills: 0 | DISCHARGE

## 2019-08-23 NOTE — H&P CARDIOLOGY - RS GEN PE MLT RESP DETAILS PC
diminished breath sounds, L/breath sounds equal/good air movement/airway patent/diminished breath sounds, R

## 2019-08-23 NOTE — H&P CARDIOLOGY - PMH
CAD (coronary artery disease)    Diabetes  type 2, controlled, A1c: 8  HLD (hyperlipidemia)    HTN (hypertension)    MI (myocardial infarction)    Sleep apnea, unspecified type    Stented coronary artery

## 2019-08-23 NOTE — H&P CARDIOLOGY - HISTORY OF PRESENT ILLNESS
65 y/o  male, former cigarette smoker x 60 PY with PMHx of MI, Afib on Eliquis, HTN, HLD FALGUNI on CPAP (uses intermittently), T2DM on insulin A1C 7.5 in 5/2019, and CAD s/p CABG x 2 in 1994 and CABG x 3 in 2004 and multiple subsequent PCI's, 17 stents total to date last 5/2019 NORRIS x 1 to Left Main offering c/o continued intermittent exertional CP and dyspnea (relieved with SL NTG).  Patient had NST on 8/21/2019 showing moderate sized area of severe anterior septal ischemia, small apical infarction with mild periinfarction ischemia, LVEF 50%, and apical hypokinesis.  Patient is now for [facility] for which he presents today. 65 y/o  male, former cigarette smoker x 60 PY with PMHx of COPD, MI, Afib on Eliquis, HTN, HLD FALGUNI on CPAP (uses intermittently), T2DM on insulin A1C 7.5 in 5/2019, and CAD s/p CABG x 2 in 1994 and CABG x 3 in 2004 and multiple subsequent PCI's, 17 stents total to date last 5/2019 NORRIS x 1 to Left Main offering c/o continued intermittent exertional CP and dyspnea (relieved with SL NTG).  Patient had NST on 8/21/2019 showing moderate sized area of severe anterior septal ischemia, small apical infarction with mild periinfarction ischemia, LVEF 50%, and apical hypokinesis.  Patient is now for [facility] for which he presents today.

## 2021-02-20 ENCOUNTER — EMERGENCY (EMERGENCY)
Facility: HOSPITAL | Age: 66
LOS: 1 days | Discharge: HOME | End: 2021-02-20
Attending: EMERGENCY MEDICINE | Admitting: EMERGENCY MEDICINE
Payer: MEDICARE

## 2021-02-20 VITALS
RESPIRATION RATE: 18 BRPM | HEART RATE: 78 BPM | DIASTOLIC BLOOD PRESSURE: 61 MMHG | OXYGEN SATURATION: 98 % | TEMPERATURE: 97 F | HEIGHT: 70 IN | SYSTOLIC BLOOD PRESSURE: 132 MMHG

## 2021-02-20 DIAGNOSIS — Z20.822 CONTACT WITH AND (SUSPECTED) EXPOSURE TO COVID-19: ICD-10-CM

## 2021-02-20 DIAGNOSIS — Z95.1 PRESENCE OF AORTOCORONARY BYPASS GRAFT: Chronic | ICD-10-CM

## 2021-02-20 DIAGNOSIS — Z95.5 PRESENCE OF CORONARY ANGIOPLASTY IMPLANT AND GRAFT: Chronic | ICD-10-CM

## 2021-02-20 PROCEDURE — 99282 EMERGENCY DEPT VISIT SF MDM: CPT

## 2021-02-20 NOTE — ED PROVIDER NOTE - PATIENT PORTAL LINK FT
You can access the FollowMyHealth Patient Portal offered by [facility] by registering at the following website: [email or web address]. By joining [employer]’s FollowMyHealth portal, you will also be able to view your health information using other applications (apps) compatible with our system.

## 2021-02-20 NOTE — ED PROVIDER NOTE - OBJECTIVE STATEMENT
65 y/o M presents to the ED requesting to have testing done for COVID-19. Pt endorses he is asymptomatic at this time. Pt denies fevers, chills, coughs, CP, and SOB.

## 2021-02-21 LAB — SARS-COV-2 RNA SPEC QL NAA+PROBE: SIGNIFICANT CHANGE UP

## 2021-02-23 ENCOUNTER — OUTPATIENT (OUTPATIENT)
Dept: OUTPATIENT SERVICES | Facility: HOSPITAL | Age: 66
LOS: 1 days | End: 2021-02-23
Payer: MEDICARE

## 2021-02-23 VITALS
WEIGHT: 225.97 LBS | HEART RATE: 75 BPM | TEMPERATURE: 98 F | RESPIRATION RATE: 16 BRPM | HEIGHT: 70 IN | OXYGEN SATURATION: 97 % | SYSTOLIC BLOOD PRESSURE: 103 MMHG | DIASTOLIC BLOOD PRESSURE: 66 MMHG

## 2021-02-23 VITALS
HEART RATE: 76 BPM | DIASTOLIC BLOOD PRESSURE: 57 MMHG | SYSTOLIC BLOOD PRESSURE: 102 MMHG | RESPIRATION RATE: 18 BRPM | OXYGEN SATURATION: 97 %

## 2021-02-23 DIAGNOSIS — Z95.1 PRESENCE OF AORTOCORONARY BYPASS GRAFT: Chronic | ICD-10-CM

## 2021-02-23 DIAGNOSIS — I20.0 UNSTABLE ANGINA: ICD-10-CM

## 2021-02-23 DIAGNOSIS — Z95.5 PRESENCE OF CORONARY ANGIOPLASTY IMPLANT AND GRAFT: Chronic | ICD-10-CM

## 2021-02-23 LAB
ANION GAP SERPL CALC-SCNC: 11 MMOL/L — SIGNIFICANT CHANGE UP (ref 5–17)
BASOPHILS # BLD AUTO: 0.08 K/UL — SIGNIFICANT CHANGE UP (ref 0–0.2)
BASOPHILS NFR BLD AUTO: 0.8 % — SIGNIFICANT CHANGE UP (ref 0–2)
BUN SERPL-MCNC: 43 MG/DL — HIGH (ref 7–23)
CALCIUM SERPL-MCNC: 10.2 MG/DL — SIGNIFICANT CHANGE UP (ref 8.4–10.5)
CHLORIDE SERPL-SCNC: 95 MMOL/L — LOW (ref 96–108)
CO2 SERPL-SCNC: 26 MMOL/L — SIGNIFICANT CHANGE UP (ref 22–31)
CREAT SERPL-MCNC: 1.16 MG/DL — SIGNIFICANT CHANGE UP (ref 0.5–1.3)
EOSINOPHIL # BLD AUTO: 0.14 K/UL — SIGNIFICANT CHANGE UP (ref 0–0.5)
EOSINOPHIL NFR BLD AUTO: 1.5 % — SIGNIFICANT CHANGE UP (ref 0–6)
GLUCOSE BLDC GLUCOMTR-MCNC: 105 MG/DL — HIGH (ref 70–99)
GLUCOSE SERPL-MCNC: 134 MG/DL — HIGH (ref 70–99)
HCT VFR BLD CALC: 43.8 % — SIGNIFICANT CHANGE UP (ref 39–50)
HGB BLD-MCNC: 14.7 G/DL — SIGNIFICANT CHANGE UP (ref 13–17)
IMM GRANULOCYTES NFR BLD AUTO: 0.4 % — SIGNIFICANT CHANGE UP (ref 0–1.5)
LYMPHOCYTES # BLD AUTO: 2.45 K/UL — SIGNIFICANT CHANGE UP (ref 1–3.3)
LYMPHOCYTES # BLD AUTO: 25.8 % — SIGNIFICANT CHANGE UP (ref 13–44)
MCHC RBC-ENTMCNC: 31.5 PG — SIGNIFICANT CHANGE UP (ref 27–34)
MCHC RBC-ENTMCNC: 33.6 GM/DL — SIGNIFICANT CHANGE UP (ref 32–36)
MCV RBC AUTO: 93.8 FL — SIGNIFICANT CHANGE UP (ref 80–100)
MONOCYTES # BLD AUTO: 0.94 K/UL — HIGH (ref 0–0.9)
MONOCYTES NFR BLD AUTO: 9.9 % — SIGNIFICANT CHANGE UP (ref 2–14)
NEUTROPHILS # BLD AUTO: 5.83 K/UL — SIGNIFICANT CHANGE UP (ref 1.8–7.4)
NEUTROPHILS NFR BLD AUTO: 61.6 % — SIGNIFICANT CHANGE UP (ref 43–77)
NRBC # BLD: 0 /100 WBCS — SIGNIFICANT CHANGE UP (ref 0–0)
PLATELET # BLD AUTO: 174 K/UL — SIGNIFICANT CHANGE UP (ref 150–400)
POTASSIUM SERPL-MCNC: 4.7 MMOL/L — SIGNIFICANT CHANGE UP (ref 3.5–5.3)
POTASSIUM SERPL-SCNC: 4.7 MMOL/L — SIGNIFICANT CHANGE UP (ref 3.5–5.3)
RBC # BLD: 4.67 M/UL — SIGNIFICANT CHANGE UP (ref 4.2–5.8)
RBC # FLD: 13.6 % — SIGNIFICANT CHANGE UP (ref 10.3–14.5)
SODIUM SERPL-SCNC: 132 MMOL/L — LOW (ref 135–145)
WBC # BLD: 9.48 K/UL — SIGNIFICANT CHANGE UP (ref 3.8–10.5)
WBC # FLD AUTO: 9.48 K/UL — SIGNIFICANT CHANGE UP (ref 3.8–10.5)

## 2021-02-23 PROCEDURE — 93571 IV DOP VEL&/PRESS C FLO 1ST: CPT | Mod: LC

## 2021-02-23 PROCEDURE — C1894: CPT

## 2021-02-23 PROCEDURE — 99153 MOD SED SAME PHYS/QHP EA: CPT

## 2021-02-23 PROCEDURE — 99152 MOD SED SAME PHYS/QHP 5/>YRS: CPT

## 2021-02-23 PROCEDURE — 85025 COMPLETE CBC W/AUTO DIFF WBC: CPT

## 2021-02-23 PROCEDURE — C1887: CPT

## 2021-02-23 PROCEDURE — 93459 L HRT ART/GRFT ANGIO: CPT | Mod: 26

## 2021-02-23 PROCEDURE — 80048 BASIC METABOLIC PNL TOTAL CA: CPT

## 2021-02-23 PROCEDURE — 93005 ELECTROCARDIOGRAM TRACING: CPT

## 2021-02-23 PROCEDURE — 82962 GLUCOSE BLOOD TEST: CPT

## 2021-02-23 PROCEDURE — 93459 L HRT ART/GRFT ANGIO: CPT

## 2021-02-23 PROCEDURE — 93010 ELECTROCARDIOGRAM REPORT: CPT

## 2021-02-23 PROCEDURE — C1769: CPT

## 2021-02-23 PROCEDURE — 93571 IV DOP VEL&/PRESS C FLO 1ST: CPT | Mod: 26,LC

## 2021-02-23 RX ORDER — INSULIN ASPART 100 [IU]/ML
5 INJECTION, SOLUTION SUBCUTANEOUS
Qty: 0 | Refills: 0 | DISCHARGE

## 2021-02-23 RX ORDER — ALBUTEROL 90 UG/1
2 AEROSOL, METERED ORAL
Qty: 0 | Refills: 0 | DISCHARGE

## 2021-02-23 RX ORDER — ATENOLOL 25 MG/1
1 TABLET ORAL
Qty: 0 | Refills: 0 | DISCHARGE

## 2021-02-23 RX ORDER — FUROSEMIDE 40 MG
1 TABLET ORAL
Qty: 0 | Refills: 0 | DISCHARGE

## 2021-02-23 NOTE — H&P CARDIOLOGY - TOBACCO USE
Pharmacy -- Admission Medication Reconciliation    Prior to admission (PTA) medications were reviewed and the patient's PTA medication list was updated.    Sources Consulted: patient phone interview, sure scripts, [place] , chart review    The reliability of this Medication Reconciliation is: Reliability: Reliable    The following significant changes were made:    Updated APAP to 500 mg strength    Updated ibuprofen to four times daily (not prn, patient takes at 0600, 1200, 1800, 0000)    Removed duplicate oxycodone x 2    Added loratadine prn    Added cetirizine prn    **patient takes gabapentin at 1800, 2100, 0000  **patient takes ibuprofen at 0600, 1200, 1800, 0000  **patient has been taking sertraline as needed   **patient states he takes oxycodone prn but has been filled monthly for the past year    In addition, the patient's allergies were reviewed with the patient and updated as follows:   Allergies: Betadine [povidone iodine] and Liquid adhesive    The pharmacist has reviewed with the patient that all personal medications should be removed from the building or locked in the belongings safe.  Patient shall only take medications ordered by the physician and administered by the nursing staff.       Medication barriers identified: unknown, hypoxia after being in the OR   Medication adherence concerns: none noted   Understanding of emergency medications: patient has an rx for naloxone but has never filled it    Carrie Valentin RPH, 8/24/2020,  4:22 PM      Former smoker

## 2021-02-23 NOTE — H&P CARDIOLOGY - HISTORY OF PRESENT ILLNESS
65 y/o  male, former cigarette smoker x 60 PY with PMHx of COPD, MI, Afib on Eliquis, HTN, HLD FALGUNI on CPAP, T2DM on insulin A1C 7.5 in 5/2019, and CAD s/p CABG x 2 in 1994 and CABG x 3 in 2004 and multiple subsequent PCI's, 17 total stents. Pt says he had chest pain early this month for which he went to the ER in [place] where he was given Nitro with relief, monitored and then sent home. Patient had stress test done 1 week ago at Dr. Wilson's office which was abnormal as per patient, presents today for [facility], sent by Dr. Wilson.     8/23/19 Cath:  CORONARY VESSELS: The coronary circulation is right dominant.  LM:   --  LM: Normal. There was no significant restenosis.  LAD:   --  Proximal LAD: There was a 100 % stenosis.  CX:   --  Distal circumflex: Angiography showed mild atherosclerosis with  no flow limiting lesions.  RCA:   --  Proximal RCA: There was a 100 % stenosis.  GRAFTS:   --  Graft to the LAD: The graft was a saphenous vein graft. There  was a 100 % stenosis in the proximal third of the graft.  --  Graft to the 2nd obtuse marginal: The graft was a saphenous vein graft.  Graft angiography showed no evidence of disease.  --  Graft to the RPDA: The graft was a saphenous vein graft. Graft  angiography showed no evidence of disease.  AORTA: Ascending aorta: Normal.  COMPLICATIONS: There were no complications.  DIAGNOSTIC RECOMMENDATIONS: The patient should continue with the present  medications.

## 2021-02-23 NOTE — ASU PATIENT PROFILE, ADULT - PSH
S/P CABG x 2  1994 in [place]  S/P CABG x 3  2004, [facility]  S/P drug eluting coronary stent placement

## 2021-02-23 NOTE — ASU DISCHARGE PLAN (ADULT/PEDIATRIC) - CARE PROVIDER_API CALL
Eleno Wilson J  CARDIOVASCULAR DISEASE  [address] [place] [address]  Phone: [phone]  Fax: [phone]  Established Patient  Follow Up Time: 2 weeks

## 2021-07-16 NOTE — H&P CARDIOLOGY - [facility] PE MLT D E PC
Brian is in need of a Patient Assistant for his Linq II loop recorder. His wife was going to check his box that they received at the time of implant to see if it is in there. If not then we will need to place an order with [employer] to send the patient a Patient Assistant for the Linq II device as well as a return kit envelope for his previous [employer] monitor from his previous loop recorder.    no rub/no murmur/normal PMI

## 2024-03-26 ENCOUNTER — NON-APPOINTMENT (OUTPATIENT)
Age: 69
End: 2024-03-26

## 2024-03-28 ENCOUNTER — TRANSCRIPTION ENCOUNTER (OUTPATIENT)
Age: 69
End: 2024-03-28

## 2024-03-28 ENCOUNTER — INPATIENT (INPATIENT)
Facility: HOSPITAL | Age: 69
LOS: 0 days | Discharge: HOME | DRG: 322 | End: 2024-03-29
Attending: INTERNAL MEDICINE | Admitting: INTERNAL MEDICINE
Payer: COMMERCIAL

## 2024-03-28 VITALS
SYSTOLIC BLOOD PRESSURE: 98 MMHG | RESPIRATION RATE: 16 BRPM | TEMPERATURE: 98 F | WEIGHT: 220.02 LBS | OXYGEN SATURATION: 94 % | HEIGHT: 70 IN | HEART RATE: 70 BPM | DIASTOLIC BLOOD PRESSURE: 62 MMHG

## 2024-03-28 DIAGNOSIS — Z95.1 PRESENCE OF AORTOCORONARY BYPASS GRAFT: Chronic | ICD-10-CM

## 2024-03-28 DIAGNOSIS — Z95.5 PRESENCE OF CORONARY ANGIOPLASTY IMPLANT AND GRAFT: Chronic | ICD-10-CM

## 2024-03-28 DIAGNOSIS — R94.39 ABNORMAL RESULT OF OTHER CARDIOVASCULAR FUNCTION STUDY: ICD-10-CM

## 2024-03-28 LAB
ANION GAP SERPL CALC-SCNC: 16 MMOL/L — SIGNIFICANT CHANGE UP (ref 5–17)
BUN SERPL-MCNC: 52 MG/DL — HIGH (ref 7–23)
CALCIUM SERPL-MCNC: 10 MG/DL — SIGNIFICANT CHANGE UP (ref 8.4–10.5)
CHLORIDE SERPL-SCNC: 96 MMOL/L — SIGNIFICANT CHANGE UP (ref 96–108)
CO2 SERPL-SCNC: 24 MMOL/L — SIGNIFICANT CHANGE UP (ref 22–31)
CREAT SERPL-MCNC: 1.49 MG/DL — HIGH (ref 0.5–1.3)
EGFR: 51 ML/MIN/1.73M2 — LOW
GLUCOSE BLDC GLUCOMTR-MCNC: 139 MG/DL — HIGH (ref 70–99)
GLUCOSE BLDC GLUCOMTR-MCNC: 145 MG/DL — HIGH (ref 70–99)
GLUCOSE BLDC GLUCOMTR-MCNC: 170 MG/DL — HIGH (ref 70–99)
GLUCOSE BLDC GLUCOMTR-MCNC: 176 MG/DL — HIGH (ref 70–99)
GLUCOSE BLDC GLUCOMTR-MCNC: 225 MG/DL — HIGH (ref 70–99)
GLUCOSE SERPL-MCNC: 138 MG/DL — HIGH (ref 70–99)
HCT VFR BLD CALC: 40.7 % — SIGNIFICANT CHANGE UP (ref 39–50)
HGB BLD-MCNC: 13.4 G/DL — SIGNIFICANT CHANGE UP (ref 13–17)
MCHC RBC-ENTMCNC: 31.4 PG — SIGNIFICANT CHANGE UP (ref 27–34)
MCHC RBC-ENTMCNC: 32.9 GM/DL — SIGNIFICANT CHANGE UP (ref 32–36)
MCV RBC AUTO: 95.3 FL — SIGNIFICANT CHANGE UP (ref 80–100)
NRBC # BLD: 0 /100 WBCS — SIGNIFICANT CHANGE UP (ref 0–0)
PLATELET # BLD AUTO: 131 K/UL — LOW (ref 150–400)
POTASSIUM SERPL-MCNC: 4.1 MMOL/L — SIGNIFICANT CHANGE UP (ref 3.5–5.3)
POTASSIUM SERPL-SCNC: 4.1 MMOL/L — SIGNIFICANT CHANGE UP (ref 3.5–5.3)
RBC # BLD: 4.27 M/UL — SIGNIFICANT CHANGE UP (ref 4.2–5.8)
RBC # FLD: 15.4 % — HIGH (ref 10.3–14.5)
SODIUM SERPL-SCNC: 136 MMOL/L — SIGNIFICANT CHANGE UP (ref 135–145)
WBC # BLD: 7.81 K/UL — SIGNIFICANT CHANGE UP (ref 3.8–10.5)
WBC # FLD AUTO: 7.81 K/UL — SIGNIFICANT CHANGE UP (ref 3.8–10.5)

## 2024-03-28 PROCEDURE — 92928 PRQ TCAT PLMT NTRAC ST 1 LES: CPT | Mod: LC

## 2024-03-28 PROCEDURE — 99152 MOD SED SAME PHYS/QHP 5/>YRS: CPT

## 2024-03-28 PROCEDURE — 93010 ELECTROCARDIOGRAM REPORT: CPT

## 2024-03-28 PROCEDURE — 93455 CORONARY ART/GRFT ANGIO S&I: CPT | Mod: 26,59

## 2024-03-28 RX ORDER — FUROSEMIDE 40 MG
1 TABLET ORAL
Refills: 0 | DISCHARGE

## 2024-03-28 RX ORDER — FUROSEMIDE 40 MG
80 TABLET ORAL DAILY
Refills: 0 | Status: DISCONTINUED | OUTPATIENT
Start: 2024-03-29 | End: 2024-03-29

## 2024-03-28 RX ORDER — INSULIN ASPART 100 [IU]/ML
6 INJECTION, SOLUTION SUBCUTANEOUS
Qty: 0 | Refills: 0 | DISCHARGE

## 2024-03-28 RX ORDER — DEXTROSE 50 % IN WATER 50 %
12.5 SYRINGE (ML) INTRAVENOUS ONCE
Refills: 0 | Status: DISCONTINUED | OUTPATIENT
Start: 2024-03-28 | End: 2024-03-29

## 2024-03-28 RX ORDER — SODIUM CHLORIDE 9 MG/ML
1000 INJECTION INTRAMUSCULAR; INTRAVENOUS; SUBCUTANEOUS
Refills: 0 | Status: DISCONTINUED | OUTPATIENT
Start: 2024-03-28 | End: 2024-03-29

## 2024-03-28 RX ORDER — INSULIN LISPRO 100/ML
4 VIAL (ML) SUBCUTANEOUS
Refills: 0 | Status: DISCONTINUED | OUTPATIENT
Start: 2024-03-28 | End: 2024-03-29

## 2024-03-28 RX ORDER — ASPIRIN/CALCIUM CARB/MAGNESIUM 324 MG
81 TABLET ORAL DAILY
Refills: 0 | Status: DISCONTINUED | OUTPATIENT
Start: 2024-03-28 | End: 2024-03-29

## 2024-03-28 RX ORDER — DEXTROSE 50 % IN WATER 50 %
15 SYRINGE (ML) INTRAVENOUS ONCE
Refills: 0 | Status: DISCONTINUED | OUTPATIENT
Start: 2024-03-28 | End: 2024-03-29

## 2024-03-28 RX ORDER — GLUCAGON INJECTION, SOLUTION 0.5 MG/.1ML
1 INJECTION, SOLUTION SUBCUTANEOUS ONCE
Refills: 0 | Status: DISCONTINUED | OUTPATIENT
Start: 2024-03-28 | End: 2024-03-29

## 2024-03-28 RX ORDER — INSULIN LISPRO 100/ML
VIAL (ML) SUBCUTANEOUS AT BEDTIME
Refills: 0 | Status: DISCONTINUED | OUTPATIENT
Start: 2024-03-28 | End: 2024-03-29

## 2024-03-28 RX ORDER — EMPAGLIFLOZIN 10 MG/1
1 TABLET, FILM COATED ORAL
Qty: 0 | Refills: 0 | DISCHARGE

## 2024-03-28 RX ORDER — TOPIRAMATE 25 MG
1 TABLET ORAL
Refills: 0 | DISCHARGE

## 2024-03-28 RX ORDER — TIOTROPIUM BROMIDE AND OLODATEROL 3.124; 2.736 UG/1; UG/1
2 SPRAY, METERED RESPIRATORY (INHALATION)
Qty: 0 | Refills: 0 | DISCHARGE

## 2024-03-28 RX ORDER — SPIRONOLACTONE 25 MG/1
1 TABLET, FILM COATED ORAL
Refills: 0 | DISCHARGE

## 2024-03-28 RX ORDER — UMECLIDINIUM BROMIDE AND VILANTEROL TRIFENATATE 62.5; 25 UG/1; UG/1
1 POWDER RESPIRATORY (INHALATION)
Qty: 0 | Refills: 0 | DISCHARGE

## 2024-03-28 RX ORDER — ISOSORBIDE MONONITRATE 60 MG/1
60 TABLET, EXTENDED RELEASE ORAL
Refills: 0 | Status: DISCONTINUED | OUTPATIENT
Start: 2024-03-28 | End: 2024-03-29

## 2024-03-28 RX ORDER — TRAZODONE HCL 50 MG
1 TABLET ORAL
Refills: 0 | DISCHARGE

## 2024-03-28 RX ORDER — FENOFIBRATE,MICRONIZED 130 MG
1 CAPSULE ORAL
Refills: 0 | DISCHARGE

## 2024-03-28 RX ORDER — CLOPIDOGREL BISULFATE 75 MG/1
75 TABLET, FILM COATED ORAL DAILY
Refills: 0 | Status: DISCONTINUED | OUTPATIENT
Start: 2024-03-28 | End: 2024-03-29

## 2024-03-28 RX ORDER — RAMIPRIL 5 MG
1 CAPSULE ORAL
Qty: 0 | Refills: 0 | DISCHARGE

## 2024-03-28 RX ORDER — SODIUM CHLORIDE 9 MG/ML
1000 INJECTION, SOLUTION INTRAVENOUS
Refills: 0 | Status: DISCONTINUED | OUTPATIENT
Start: 2024-03-28 | End: 2024-03-29

## 2024-03-28 RX ORDER — FUROSEMIDE 40 MG
40 TABLET ORAL AT BEDTIME
Refills: 0 | Status: DISCONTINUED | OUTPATIENT
Start: 2024-03-29 | End: 2024-03-29

## 2024-03-28 RX ORDER — ATORVASTATIN CALCIUM 80 MG/1
1 TABLET, FILM COATED ORAL
Qty: 0 | Refills: 0 | DISCHARGE

## 2024-03-28 RX ORDER — FENOFIBRATE,MICRONIZED 130 MG
1 CAPSULE ORAL
Qty: 0 | Refills: 0 | DISCHARGE

## 2024-03-28 RX ORDER — SERTRALINE 25 MG/1
2 TABLET, FILM COATED ORAL
Qty: 0 | Refills: 0 | DISCHARGE

## 2024-03-28 RX ORDER — ALIROCUMAB 75 MG/ML
150 INJECTION, SOLUTION SUBCUTANEOUS
Refills: 0 | DISCHARGE

## 2024-03-28 RX ORDER — ASPIRIN/CALCIUM CARB/MAGNESIUM 324 MG
1 TABLET ORAL
Qty: 0 | Refills: 0 | DISCHARGE
Start: 2024-03-28

## 2024-03-28 RX ORDER — POTASSIUM CHLORIDE 20 MEQ
1 PACKET (EA) ORAL
Refills: 0 | DISCHARGE

## 2024-03-28 RX ORDER — INSULIN LISPRO 100/ML
VIAL (ML) SUBCUTANEOUS
Refills: 0 | Status: DISCONTINUED | OUTPATIENT
Start: 2024-03-28 | End: 2024-03-29

## 2024-03-28 RX ORDER — LISINOPRIL 2.5 MG/1
10 TABLET ORAL DAILY
Refills: 0 | Status: DISCONTINUED | OUTPATIENT
Start: 2024-03-29 | End: 2024-03-29

## 2024-03-28 RX ORDER — INSULIN GLARGINE 100 [IU]/ML
32 INJECTION, SOLUTION SUBCUTANEOUS AT BEDTIME
Refills: 0 | Status: DISCONTINUED | OUTPATIENT
Start: 2024-03-28 | End: 2024-03-29

## 2024-03-28 RX ORDER — DEXTROSE 50 % IN WATER 50 %
25 SYRINGE (ML) INTRAVENOUS ONCE
Refills: 0 | Status: DISCONTINUED | OUTPATIENT
Start: 2024-03-28 | End: 2024-03-29

## 2024-03-28 RX ORDER — TOPIRAMATE 25 MG
200 TABLET ORAL DAILY
Refills: 0 | Status: DISCONTINUED | OUTPATIENT
Start: 2024-03-28 | End: 2024-03-29

## 2024-03-28 RX ORDER — CHOLECALCIFEROL (VITAMIN D3) 125 MCG
1 CAPSULE ORAL
Qty: 0 | Refills: 0 | DISCHARGE

## 2024-03-28 RX ORDER — TOPIRAMATE 25 MG
1 TABLET ORAL
Qty: 0 | Refills: 0 | DISCHARGE

## 2024-03-28 RX ORDER — INSULIN NPH HUM/REG INSULIN HM 70-30/ML
6 VIAL (ML) SUBCUTANEOUS
Refills: 0 | DISCHARGE

## 2024-03-28 RX ORDER — TRAZODONE HCL 50 MG
100 TABLET ORAL AT BEDTIME
Refills: 0 | Status: DISCONTINUED | OUTPATIENT
Start: 2024-03-28 | End: 2024-03-29

## 2024-03-28 RX ORDER — APIXABAN 2.5 MG/1
5 TABLET, FILM COATED ORAL EVERY 12 HOURS
Refills: 0 | Status: DISCONTINUED | OUTPATIENT
Start: 2024-03-29 | End: 2024-03-29

## 2024-03-28 RX ORDER — ATENOLOL 25 MG/1
75 TABLET ORAL DAILY
Refills: 0 | Status: DISCONTINUED | OUTPATIENT
Start: 2024-03-28 | End: 2024-03-29

## 2024-03-28 RX ORDER — INSULIN GLARGINE 100 [IU]/ML
40 INJECTION, SOLUTION SUBCUTANEOUS
Refills: 0 | DISCHARGE

## 2024-03-28 RX ORDER — ATENOLOL 25 MG/1
75 TABLET ORAL
Qty: 0 | Refills: 0 | DISCHARGE

## 2024-03-28 RX ADMIN — Medication 2: at 12:27

## 2024-03-28 RX ADMIN — ISOSORBIDE MONONITRATE 60 MILLIGRAM(S): 60 TABLET, EXTENDED RELEASE ORAL at 21:22

## 2024-03-28 RX ADMIN — Medication 1: at 16:20

## 2024-03-28 RX ADMIN — Medication 4 UNIT(S): at 12:27

## 2024-03-28 RX ADMIN — Medication 4 UNIT(S): at 16:19

## 2024-03-28 RX ADMIN — Medication 100 MILLIGRAM(S): at 21:42

## 2024-03-28 RX ADMIN — SODIUM CHLORIDE 75 MILLILITER(S): 9 INJECTION INTRAMUSCULAR; INTRAVENOUS; SUBCUTANEOUS at 09:34

## 2024-03-28 RX ADMIN — INSULIN GLARGINE 32 UNIT(S): 100 INJECTION, SOLUTION SUBCUTANEOUS at 21:22

## 2024-03-28 RX ADMIN — Medication 200 MILLIGRAM(S): at 21:23

## 2024-03-28 NOTE — ASU PATIENT PROFILE, ADULT - FALL HARM RISK - UNIVERSAL INTERVENTIONS
Bed in lowest position, wheels locked, appropriate side rails in place/Call bell, personal items and telephone in reach/Instruct patient to call for assistance before getting out of bed or chair/Non-slip footwear when patient is out of bed/[place] to call system/Physically safe environment - no spills, clutter or unnecessary equipment/Purposeful Proactive Rounding/Room/bathroom lighting operational, light cord in reach

## 2024-03-28 NOTE — DISCHARGE NOTE PROVIDER - CARE PROVIDER_API CALL
Zander Wilson  Cardiovascular Disease  [address] [place] [id number]  Phone: [phone]  Fax: [phone]  Follow Up Time: 2 weeks

## 2024-03-28 NOTE — H&P CARDIOLOGY - NSICDXPASTSURGICALHX_GEN_ALL_CORE_FT
PAST SURGICAL HISTORY:  S/P CABG x 2 1994 in [place]    S/P CABG x 3 2004, [facility]    S/P drug eluting coronary stent placement

## 2024-03-28 NOTE — DISCHARGE NOTE PROVIDER - NSDCMRMEDTOKEN_GEN_ALL_CORE_FT
apixaban 5 mg oral tablet: 1 tab(s) orally every 12 hours  aspirin 81 mg oral delayed release tablet: 1 tab(s) orally once a day  atenolol: 75 milligram(s) orally once a day  clopidogrel 75 mg oral tablet: 1 tab(s) orally once a day  fenofibrate 160 mg oral tablet: 1 tab(s) orally once a day  furosemide 40 mg oral tablet: 1 tab(s) orally once a day (at bedtime)  isosorbide mononitrate 60 mg oral tablet, extended release: 1 tab(s) orally 2 times a day  Jardiance 10 mg oral tablet: 1 tab(s) orally once a day (in the morning)  Lantus Solostar Pen 100 units/mL subcutaneous solution: 40 milligram(s) subcutaneous once a day (at bedtime)  Lasix 80 mg oral tablet: 1 tab(s) orally once a day  metFORMIN 1000 mg oral tablet, extended release: 1 tab(s) orally 2 times a day DO NOT TAKE on 3/29 or 3/30, restart on Miki 3/31.  NovoLIN 70/30 FlexPen subcutaneous suspension: 6 unit(s) subcutaneous 3 times a day Sliding scale between 6-14 units  potassium chloride 20 mEq oral granule, extended release: 1 tab(s) orally 2 times a day  Praluent Pen 150 mg/mL subcutaneous solution: 150 milligram(s) subcutaneously every other week  ramipril 2.5 mg oral capsule: 1 cap(s) orally once a day  spironolactone 50 mg oral tablet: 1 tab(s) orally once a day  topiramate 200 mg oral tablet: 1 tab(s) orally once a day  traZODone 100 mg oral tablet: 1 tab(s) orally once a day (at bedtime)   apixaban 5 mg oral tablet: 1 tab(s) orally every 12 hours  aspirin 81 mg oral delayed release tablet: 1 tab(s) orally once a day  atenolol: 75 milligram(s) orally once a day  clopidogrel 75 mg oral tablet: 1 tab(s) orally once a day  fenofibrate 160 mg oral tablet: 1 tab(s) orally once a day  furosemide 40 mg oral tablet: 1 tab(s) orally once a day (at bedtime)  isosorbide mononitrate 60 mg oral tablet, extended release: 1 tab(s) orally 2 times a day  Jardiance 10 mg oral tablet: 1 tab(s) orally once a day (in the morning)  Lantus Solostar Pen 100 units/mL subcutaneous solution: 40 milligram(s) subcutaneous once a day (at bedtime)  Lasix 80 mg oral tablet: 1 tab(s) orally once a day  metFORMIN 1000 mg oral tablet, extended release: 1 tab(s) orally 2 times a day DO NOT TAKE on 3/29 or 3/30, restart on Miki 3/31/2024.  NovoLIN 70/30 FlexPen subcutaneous suspension: 6 unit(s) subcutaneous 3 times a day Sliding scale between 6-14 units  potassium chloride 20 mEq oral granule, extended release: 1 tab(s) orally 2 times a day  Praluent Pen 150 mg/mL subcutaneous solution: 150 milligram(s) subcutaneously every other week  ramipril 2.5 mg oral capsule: 1 cap(s) orally once a day  spironolactone 50 mg oral tablet: 1 tab(s) orally once a day  topiramate 200 mg oral tablet: 1 tab(s) orally once a day  traZODone 100 mg oral tablet: 1 tab(s) orally once a day (at bedtime)

## 2024-03-28 NOTE — H&P CARDIOLOGY - NSICDXPASTMEDICALHX_GEN_ALL_CORE_FT
PAST MEDICAL HISTORY:  CAD (coronary artery disease)     Diabetes type 2, controlled, A1c: 8    HLD (hyperlipidemia)     HTN (hypertension)     MI (myocardial infarction)     Sleep apnea, unspecified type     Stented coronary artery

## 2024-03-28 NOTE — DISCHARGE NOTE PROVIDER - HOSPITAL COURSE
HPI:  68M with PMH of HTN, HLD, Afib on Eliquis, CAD s/p CABG in 1998 and 2004, PCI, diastolic CHF, DM2, COPD, seizure, PTSD, anxiety, depression presents for [facility]. Reports his recent hospitalization for CHF exacerbation in 1/2024 and had NST concerning for ischemia. CP and SOB at rest and on exertion, but hard to differentiate in the setting of CHF, COPD, and complicated CAD. Still has LE edema, which was better on Metolazone but his Cards switched to Lasix and Spironolactone. Denies lightheadedness, HA, abdominal pain, N/V, hematuria, BRBPR, melena, syncope, or any other complaints. No implanted cardiac devices. No CP, SOB, palpitation at this time.    Cards: Dr. Eleno Wilson  NST on 1/18/24 - Abnormal myocardial perfusion, reversible defect that is moderate in size of moderate intensity in the apical and septal walls consistent with ischemia. Abnormal global CFR at 1.63 suggestive of microvascular disease. Resting EF 58%, stress EF 54%  TTE on 1/4/24 - EF 55-60%, moderately dilated LA, mild AS, mild to moderate MR (28 Mar 2024 06:42)    3/28 cardiac cath with one stent to the OM2. Right femoral site without swelling, bleeding.

## 2024-03-28 NOTE — DISCHARGE NOTE PROVIDER - NSDCCPTREATMENT_GEN_ALL_CORE_FT
PRINCIPAL PROCEDURE  Procedure: Placement of coronary artery stent  Findings and Treatment: One stent to the OM2. Continue DAPT with aspirin, Plavix.

## 2024-03-28 NOTE — DISCHARGE NOTE PROVIDER - NSDCCPCAREPLAN_GEN_ALL_CORE_FT
PRINCIPAL DISCHARGE DIAGNOSIS  Diagnosis: CAD (coronary artery disease)  Assessment and Plan of Treatment: Do not stop your aspirin or Plavix unless instructed to do so by your cardiologist, they help keep your stented arteries open.   No heavy lifting, strenuous activity, bending, straining, or unnecessary stair climbing for 2 weeks. No driving for 2 days. You may shower 24 hours following the procedure but avoid baths/swimming for 1 week. Check your groin site for bleeding and/or swelling daily following procedure and call your doctor immediately if it occurs or if you experience increased pain at the site. Follow up with your cardiologist in 1-2 weeks. You may call [facility] if you have any questions/concerns regarding your procedure [phone].      SECONDARY DISCHARGE DIAGNOSES  Diagnosis: HTN (hypertension)  Assessment and Plan of Treatment: Continue with your blood pressure medications; eat a heart healthy diet with low salt diet; exercise regularly (consult with your physician or cardiologist first); maintain a heart healthy weight; if you smoke - quit (A resource to help you stop smoking is the [facility] [employer] – phone number [phone].); include healthy ways to manage stress. Continue to follow with your primary care physician or cardiologist.    Diagnosis: HLD (hyperlipidemia)  Assessment and Plan of Treatment: Continue with your cholesterol medications. Eat a heart healthy diet that is low in saturated fats and salt, and includes whole grains, fruits, vegetables and lean protein; exercise regularly (consult with your physician or cardiologist first); maintain a heart healthy weight; if you smoke - quit (A resource to help you stop smoking is the [facility] [employer] – phone number [phone].). Continue to follow with your primary physician or cardiologist.    Diagnosis: DM type 2, goal HbA1c < 7%  Assessment and Plan of Treatment: Your Hemoglobin A1c level is   Continue to follow with your primary care MD or your endocrinologist.  Follow a heart healthy diabetic diet. If you check your fingerstick glucose at home, call your MD if it is greater than 250mg/dL on 2 occasions or less than 100mg/dL on 2 occasions. Know signs of low blood sugar, such as: dizziness, shakiness, sweating, confusion, hunger, nervousness-drink 4 ounces apple juice if occurs and call your doctor. Know early signs of high blood sugar, such as: frequent urination, increased thirst, blurry vision, fatigue, headache - call your doctor if this occurs. Follow with other practitioners to care for your diabetes, such as ophthalmologist and podiatrist.    Diagnosis: Atrial fibrillation  Assessment and Plan of Treatment: Continue with your cardiologist and primary care MD. Continue your current medications. Call your physician for palpitations, feelings of rapid heart beat, lightheadedness, or dizziness. Continue Eliquis as prescribed.

## 2024-03-28 NOTE — H&P CARDIOLOGY - NSICDXFAMILYHX_GEN_ALL_CORE_FT
FAMILY HISTORY:  Sibling  Still living? Yes, Estimated age: Age Unknown  Family history of CHF (congestive heart failure), Age at diagnosis: Age Unknown  Family history of hypertension, Age at diagnosis: Age Unknown    Child  Still living? Yes, Estimated age: Age Unknown  Family history of hypertension, Age at diagnosis: Age Unknown    Grandparent  Still living? No  Family history of CHF (congestive heart failure), Age at diagnosis: Age Unknown

## 2024-03-28 NOTE — PATIENT PROFILE ADULT - NSTRANSFERBELONGINGSRESP_GEN_A_NUR
Labs next month to recheck liver enzymes. Will also check liver autoimmune markers.  Follow-up in 1 year with [facility]  Complete hep A/B vaccines  Continue weight loss efforts; maintain good control of blood sugar/cholesterol   
no

## 2024-03-28 NOTE — H&P CARDIOLOGY - HISTORY OF PRESENT ILLNESS
68M with PMH of HTN, HLD, Afib on Eliquis, CAD s/p CABG in 1998 and 2004, PCI, diastolic CHF, DM2, COPD, seizure presents for [facility]. Reports     He recently hospitalized for CHF exacerbation in 1/2024and had NST and LHC, and was told that he had non-revascular disease and all grafts were down. Edema improved on Metolazone. No CP or SOB. Denies lightheadedness, HA, abdominal pain, N/V, hematuria, BRBPR, melena, syncope, or any other complaints. No implanted cardiac devices. No CP, SOB, palpitation at this time.      Cards: Dr. Eleno Wilson  NST on 1/18/24 - Abnormal myocardial perfusion, reversible defect that is moderate in size of moderate intensity in the apical and septal walls consistent with ischemia. Abnormal global CFR at 1.63 suggestive of microvascular disease. Resting EF 58%, stress EF 54%  TTE on 1/4/24 - EF 55-60%, moderately dilated LA, mild AS, mild to moderate MR 68M with PMH of HTN, HLD, Afib on Eliquis, CAD s/p CABG in 1998 and 2004, PCI, diastolic CHF, DM2, COPD, seizure, PTSD, anxiety, depression presents for [facility]. Reports his recent hospitalization for CHF exacerbation in 1/2024 and had NST concerning for ischemia. CP and SOB at rest and on exertion, but hard to differentiate in the setting of CHF, COPD, and complicated CAD. Still has LE edema, which was better on Metolazone but his Cards switched to Lasix and Spironolactone. Denies lightheadedness, HA, abdominal pain, N/V, hematuria, BRBPR, melena, syncope, or any other complaints. No implanted cardiac devices. No CP, SOB, palpitation at this time.    Cards: Dr. Eleno Wilson  NST on 1/18/24 - Abnormal myocardial perfusion, reversible defect that is moderate in size of moderate intensity in the apical and septal walls consistent with ischemia. Abnormal global CFR at 1.63 suggestive of microvascular disease. Resting EF 58%, stress EF 54%  TTE on 1/4/24 - EF 55-60%, moderately dilated LA, mild AS, mild to moderate MR

## 2024-03-28 NOTE — CHART NOTE - NSCHARTNOTEFT_GEN_A_CORE
Removal of Femoral Sheath    Pulses in the right lower extremity are palpable. The patient was placed in the supine position. The insertion site was identified and the sutures were removed per protocol.  The _6___ [place] femoral sheath was then removed. Direct pressure was applied for  ___20___ minutes.     Monitoring of the right groin and both lower extremities including neuro-vascular checks and vital signs every 15 minutes x 4, then every 30 minutes x 2, then every 1 hour was ordered.    Complications: None    Comments:  Activity restrictions and reportable symptoms discussed with patient    Erin Kim NP   x [phone]

## 2024-03-29 ENCOUNTER — TRANSCRIPTION ENCOUNTER (OUTPATIENT)
Age: 69
End: 2024-03-29

## 2024-03-29 VITALS
OXYGEN SATURATION: 95 % | TEMPERATURE: 98 F | SYSTOLIC BLOOD PRESSURE: 116 MMHG | HEART RATE: 83 BPM | RESPIRATION RATE: 17 BRPM | DIASTOLIC BLOOD PRESSURE: 72 MMHG

## 2024-03-29 DIAGNOSIS — I10 ESSENTIAL (PRIMARY) HYPERTENSION: ICD-10-CM

## 2024-03-29 DIAGNOSIS — E78.5 HYPERLIPIDEMIA, UNSPECIFIED: ICD-10-CM

## 2024-03-29 DIAGNOSIS — I48.91 UNSPECIFIED ATRIAL FIBRILLATION: ICD-10-CM

## 2024-03-29 DIAGNOSIS — I25.10 ATHEROSCLEROTIC HEART DISEASE OF NATIVE CORONARY ARTERY WITHOUT ANGINA PECTORIS: ICD-10-CM

## 2024-03-29 LAB
ANION GAP SERPL CALC-SCNC: 14 MMOL/L — SIGNIFICANT CHANGE UP (ref 5–17)
BUN SERPL-MCNC: 46 MG/DL — HIGH (ref 7–23)
CALCIUM SERPL-MCNC: 9.3 MG/DL — SIGNIFICANT CHANGE UP (ref 8.4–10.5)
CHLORIDE SERPL-SCNC: 101 MMOL/L — SIGNIFICANT CHANGE UP (ref 96–108)
CO2 SERPL-SCNC: 21 MMOL/L — LOW (ref 22–31)
CREAT SERPL-MCNC: 1.32 MG/DL — HIGH (ref 0.5–1.3)
EGFR: 59 ML/MIN/1.73M2 — LOW
GLUCOSE BLDC GLUCOMTR-MCNC: 118 MG/DL — HIGH (ref 70–99)
GLUCOSE SERPL-MCNC: 119 MG/DL — HIGH (ref 70–99)
POTASSIUM SERPL-MCNC: 4.4 MMOL/L — SIGNIFICANT CHANGE UP (ref 3.5–5.3)
POTASSIUM SERPL-SCNC: 4.4 MMOL/L — SIGNIFICANT CHANGE UP (ref 3.5–5.3)
SODIUM SERPL-SCNC: 136 MMOL/L — SIGNIFICANT CHANGE UP (ref 135–145)

## 2024-03-29 PROCEDURE — C1769: CPT

## 2024-03-29 PROCEDURE — 99238 HOSP IP/OBS DSCHRG MGMT 30/<: CPT

## 2024-03-29 PROCEDURE — C1887: CPT

## 2024-03-29 PROCEDURE — 80048 BASIC METABOLIC PNL TOTAL CA: CPT

## 2024-03-29 PROCEDURE — C1725: CPT

## 2024-03-29 PROCEDURE — 85027 COMPLETE CBC AUTOMATED: CPT

## 2024-03-29 PROCEDURE — 93005 ELECTROCARDIOGRAM TRACING: CPT

## 2024-03-29 PROCEDURE — C1889: CPT

## 2024-03-29 PROCEDURE — 93455 CORONARY ART/GRFT ANGIO S&I: CPT | Mod: 59

## 2024-03-29 PROCEDURE — C9600: CPT | Mod: LC

## 2024-03-29 PROCEDURE — 82962 GLUCOSE BLOOD TEST: CPT

## 2024-03-29 PROCEDURE — C1894: CPT

## 2024-03-29 RX ORDER — METFORMIN HYDROCHLORIDE 850 MG/1
1 TABLET ORAL
Qty: 0 | Refills: 0 | DISCHARGE

## 2024-03-29 RX ADMIN — Medication 4 UNIT(S): at 07:21

## 2024-03-29 RX ADMIN — CLOPIDOGREL BISULFATE 75 MILLIGRAM(S): 75 TABLET, FILM COATED ORAL at 05:35

## 2024-03-29 RX ADMIN — ATENOLOL 75 MILLIGRAM(S): 25 TABLET ORAL at 05:35

## 2024-03-29 RX ADMIN — LISINOPRIL 10 MILLIGRAM(S): 2.5 TABLET ORAL at 05:36

## 2024-03-29 RX ADMIN — APIXABAN 5 MILLIGRAM(S): 2.5 TABLET, FILM COATED ORAL at 05:35

## 2024-03-29 RX ADMIN — Medication 81 MILLIGRAM(S): at 05:35

## 2024-03-29 NOTE — DISCHARGE NOTE NURSING/CASE MANAGEMENT/SOCIAL WORK - NSDCVIVACCINE_GEN_ALL_CORE_FT
influenza, injectable, quadrivalent, preservative free; 05-Dec-2015 00:04; Park De La Rosa (RN); [employer]; [id number]; IntraMuscular; Deltoid Left.; 0.5 milliLiter(s); VIS (VIS Published: 07-Aug-2015, VIS Presented: 05-Dec-2015);

## 2024-03-29 NOTE — CONSULT NOTE ADULT - SUBJECTIVE AND OBJECTIVE BOX
CHIEF COMPLAINT: s/p PCI    HISTORY OF PRESENT ILLNESS:  68M with PMH of HTN, HLD, Afib on Eliquis, CAD s/p CABG in 1998 and 2004, PCI, diastolic CHF, DM2, COPD, seizure, PTSD, anxiety, depression presents for [facility]. Reports his recent hospitalization for CHF exacerbation in 1/2024 and had NST concerning for ischemia. CP and SOB at rest and on exertion, but hard to differentiate in the setting of CHF, COPD, and complicated CAD. Still has LE edema, which was better on Metolazone but his Cards switched to Lasix and Spironolactone. Denies lightheadedness, HA, abdominal pain, N/V, hematuria, BRBPR, melena, syncope, or any other complaints. No implanted cardiac devices. No CP, SOB, palpitation at this time.    s/p pci to OM    Allergies    morphine (Rash)    Intolerances    	    MEDICATIONS:  apixaban 5 milliGRAM(s) Oral every 12 hours  aspirin enteric coated 81 milliGRAM(s) Oral daily  atenolol  Tablet 75 milliGRAM(s) Oral daily  clopidogrel Tablet 75 milliGRAM(s) Oral daily  furosemide    Tablet 40 milliGRAM(s) Oral at bedtime  furosemide    Tablet 80 milliGRAM(s) Oral daily  isosorbide   mononitrate ER Tablet (IMDUR) 60 milliGRAM(s) Oral <User Schedule>  lisinopril 10 milliGRAM(s) Oral daily        topiramate 200 milliGRAM(s) Oral daily  traZODone 100 milliGRAM(s) Oral at bedtime      dextrose 50% Injectable 12.5 Gram(s) IV Push once  dextrose 50% Injectable 25 Gram(s) IV Push once  dextrose 50% Injectable 25 Gram(s) IV Push once  dextrose Oral Gel 15 Gram(s) Oral once PRN  glucagon  Injectable 1 milliGRAM(s) IntraMuscular once  insulin glargine Injectable (LANTUS) 32 Unit(s) SubCutaneous at bedtime  insulin lispro (ADMELOG) corrective regimen sliding scale   SubCutaneous at bedtime  insulin lispro (ADMELOG) corrective regimen sliding scale   SubCutaneous three times a day before meals  insulin lispro Injectable (ADMELOG) 4 Unit(s) SubCutaneous three times a day before meals    dextrose 5%. 1000 milliLiter(s) IV Continuous <Continuous>  dextrose 5%. 1000 milliLiter(s) IV Continuous <Continuous>  sodium chloride 0.9%. 1000 milliLiter(s) IV Continuous <Continuous>      PAST MEDICAL & SURGICAL HISTORY:  MI (myocardial infarction)      CAD (coronary artery disease)      Stented coronary artery      HTN (hypertension)      HLD (hyperlipidemia)      Diabetes  type 2, controlled, A1c: 8      Sleep apnea, unspecified type      S/P CABG x 2  1994 in [place]      S/P CABG x 3  2004, [facility]      S/P drug eluting coronary stent placement          FAMILY HISTORY:  Family history of CHF (congestive heart failure) (Sibling)    Family history of hypertension (Sibling, Child)    Family history of CHF (congestive heart failure) (Grandparent)        SOCIAL HISTORY:    non smoker    REVIEW OF SYSTEMS:  See HPI, otherwise complete 10 point review of systems negative    [ ] All others negative	      PHYSICAL EXAM:  T(C): 36.6 (03-29-24 @ 04:40), Max: 36.7 (03-28-24 @ 20:12)  HR: 83 (03-29-24 @ 04:40) (63 - 83)  BP: 116/72 (03-29-24 @ 04:40) (80/63 - 121/67)  RR: 17 (03-29-24 @ 04:40) (16 - 17)  SpO2: 95% (03-29-24 @ 04:40) (90% - 97%)  Wt(kg): --  I&O's Summary    28 Mar 2024 07:01  -  29 Mar 2024 07:00  --------------------------------------------------------  IN: 450 mL / OUT: 1150 mL / NET: -700 mL        Appearance: No Acute Distress	  HEENT:  Normal oral mucosa, PERRL, EOMI	  Cardiovascular: Normal S1 S2, No JVD, No murmurs/rubs/gallops  Respiratory: Lungs clear to auscultation bilaterally  Gastrointestinal:  Soft, Non-tender, + BS	  Skin: No rashes, No ecchymoses, No cyanosis	  Neurologic: Non-focal  Extremities: No clubbing, cyanosis or edema  Vascular: Peripheral pulses palpable 2+ bilaterally  Psychiatry: A & O x 3, Mood & affect appropriate    Laboratory Data:	 	    CBC Full  -  ( 28 Mar 2024 07:41 )  WBC Count : 7.81 K/uL  Hemoglobin : 13.4 g/dL  Hematocrit : 40.7 %  Platelet Count - Automated : 131 K/uL  Mean Cell Volume : 95.3 fl  Mean Cell Hemoglobin : 31.4 pg  Mean Cell Hemoglobin Concentration : 32.9 gm/dL  Auto Neutrophil # : x  Auto Lymphocyte # : x  Auto Monocyte # : x  Auto Eosinophil # : x  Auto Basophil # : x  Auto Neutrophil % : x  Auto Lymphocyte % : x  Auto Monocyte % : x  Auto Eosinophil % : x  Auto Basophil % : x    03-29    136  |  101  |  46<H>  ----------------------------<  119<H>  4.4   |  21<L>  |  1.32<H>  03-28    136  |  96  |  52<H>  ----------------------------<  138<H>  4.1   |  24  |  1.49<H>    Ca    9.3      29 Mar 2024 01:10  Ca    10.0      28 Mar 2024 07:41        proBNP:   Lipid Profile:   HgA1c:   TSH:       CARDIAC MARKERS:            Interpretation of Telemetry: 	    ECG:  	  RADIOLOGY:  OTHER: 	    PREVIOUS DIAGNOSTIC TESTING:    [ ] Echocardiogram:  [ ] Catheterization:  [ ] Stress Test:  	    Assessment:  68M with PMH of HTN, HLD, Afib on Eliquis, CAD s/p CABG in 1998 and 2004, PCI, diastolic CHF, DM2, COPD, seizure, PTSD, anxiety, depression presents for [facility].    Recs:  cardiac stable  s/p PCI to OM. cw dapt. resume praluent at discharge  cw diuretics to maintain euvolemic. please inc jardiance to 25mg qd  cw ac and rate control meds  dcp      Advanced care planning forms were discussed. Code status including forceful chest compressions, defibrillation and intubation were discussed. The risks benefits and alternatives to pertinent cardiac procedures and interventions were discussed in detail and all questions were answered. Duration: 15 minutes.  Greater than 90 minutes spent on total encounter; more than 50% of the visit was spent counseling and/or coordinating care by the attending physician.   	  Zander Wilson MD   Cardiovascular Diseases  [phone]

## 2024-03-29 NOTE — PROGRESS NOTE ADULT - SUBJECTIVE AND OBJECTIVE BOX
HPI:  68M with PMH of HTN, HLD, Afib on Eliquis, CAD s/p CABG in  and , PCI, diastolic CHF, DM2, COPD, seizure, PTSD, anxiety, depression presents for [facility]. Reports his recent hospitalization for CHF exacerbation in 2024 and had NST concerning for ischemia. CP and SOB at rest and on exertion, but hard to differentiate in the setting of CHF, COPD, and complicated CAD. Still has LE edema, which was better on Metolazone but his Cards switched to Lasix and Spironolactone. Denies lightheadedness, HA, abdominal pain, N/V, hematuria, BRBPR, melena, syncope, or any other complaints. No implanted cardiac devices. No CP, SOB, palpitation at this time.    Cards: Dr. Eleno Wilson  NST on 24 - Abnormal myocardial perfusion, reversible defect that is moderate in size of moderate intensity in the apical and septal walls consistent with ischemia. Abnormal global CFR at 1.63 suggestive of microvascular disease. Resting EF 58%, stress EF 54%  TTE on 24 - EF 55-60%, moderately dilated LA, mild AS, mild to moderate MR (28 Mar 2024 06:42)    Patient is a 69y old  Male who presents with a chief complaint of CAD (28 Mar 2024 23:35)          Allergies    morphine (Rash)    Intolerances        Medications:  apixaban 5 milliGRAM(s) Oral every 12 hours  aspirin enteric coated 81 milliGRAM(s) Oral daily  atenolol  Tablet 75 milliGRAM(s) Oral daily  clopidogrel Tablet 75 milliGRAM(s) Oral daily  dextrose 5%. 1000 milliLiter(s) IV Continuous <Continuous>  dextrose 5%. 1000 milliLiter(s) IV Continuous <Continuous>  dextrose 50% Injectable 12.5 Gram(s) IV Push once  dextrose 50% Injectable 25 Gram(s) IV Push once  dextrose 50% Injectable 25 Gram(s) IV Push once  dextrose Oral Gel 15 Gram(s) Oral once PRN  furosemide    Tablet 40 milliGRAM(s) Oral at bedtime  furosemide    Tablet 80 milliGRAM(s) Oral daily  glucagon  Injectable 1 milliGRAM(s) IntraMuscular once  insulin glargine Injectable (LANTUS) 32 Unit(s) SubCutaneous at bedtime  insulin lispro (ADMELOG) corrective regimen sliding scale   SubCutaneous at bedtime  insulin lispro (ADMELOG) corrective regimen sliding scale   SubCutaneous three times a day before meals  insulin lispro Injectable (ADMELOG) 4 Unit(s) SubCutaneous three times a day before meals  isosorbide   mononitrate ER Tablet (IMDUR) 60 milliGRAM(s) Oral <User Schedule>  lisinopril 10 milliGRAM(s) Oral daily  sodium chloride 0.9%. 1000 milliLiter(s) IV Continuous <Continuous>  topiramate 200 milliGRAM(s) Oral daily  traZODone 100 milliGRAM(s) Oral at bedtime      Vitals:  T(C): 36.7 (24 @ 00:23), Max: 36.8 (24 @ 06:42)  HR: 77 (24 @ 00:23) (63 - 80)  BP: 110/66 (24 @ 00:23) (80/63 - 121/67)  BP(mean): 83 (24 @ 00:23) (61 - 88)  RR: 17 (24 @ 00:23) (16 - 17)  SpO2: 97% (24 @ 00:23) (90% - 97%)  Wt(kg): --  Daily Height in cm: 177.8 (28 Mar 2024 06:42)    Daily Weight in k.8 (28 Mar 2024 06:42)  I&O's Summary    28 Mar 2024 07:01  -  29 Mar 2024 02:00  --------------------------------------------------------  IN: 450 mL / OUT: 1150 mL / NET: -700 mL          Physical Exam:  Appearance: Normal  Eyes: PERRL, EOMI  HENT: Normal oral muscosa, NC/AT  Cardiovascular: S1S2, RRR, No M/R/G, no JVD, No Lower extremity edema  Procedural Access Site: No hematoma, Non-tender to palpation, 2+ pulse, No bruit, No Ecchymosis  Respiratory: Clear to auscultation bilaterally  Gastrointestinal: Soft, Non tender, Normal Bowel Sounds  Musculoskeletal: No clubbing, No joint deformity   Neurologic: Non-focal  Lymphatic: No lymphadenopathy  Psychiatry: AAOx3, Mood & affect appropriate  Skin: No rashes, No ecchymoses, No cyanosis        136  |  101  |  46<H>  ----------------------------<  119<H>  4.4   |  21<L>  |  1.32<H>    Ca    9.3      29 Mar 2024 01:10              Lipid panel   Hgb A1c                         13.4   7.81  )-----------( 131      ( 28 Mar 2024 07:41 )             40.7         ECG: Afib 59 bpm

## 2024-03-29 NOTE — DISCHARGE NOTE NURSING/CASE MANAGEMENT/SOCIAL WORK - PATIENT PORTAL LINK FT
You can access the FollowMyHealth Patient Portal offered by [facility] by registering at the following website: [email or web address]. By joining [facility]’s FollowMyHealth portal, you will also be able to view your health information using other applications (apps) compatible with our system.

## 2024-07-26 ENCOUNTER — NON-APPOINTMENT (OUTPATIENT)
Age: 69
End: 2024-07-26

## 2024-07-29 ENCOUNTER — OUTPATIENT (OUTPATIENT)
Dept: OUTPATIENT SERVICES | Facility: HOSPITAL | Age: 69
LOS: 1 days | End: 2024-07-29
Payer: MEDICARE

## 2024-07-29 ENCOUNTER — TRANSCRIPTION ENCOUNTER (OUTPATIENT)
Age: 69
End: 2024-07-29

## 2024-07-29 VITALS
HEART RATE: 66 BPM | RESPIRATION RATE: 16 BRPM | TEMPERATURE: 98 F | HEIGHT: 69 IN | WEIGHT: 220.02 LBS | SYSTOLIC BLOOD PRESSURE: 100 MMHG | OXYGEN SATURATION: 96 % | DIASTOLIC BLOOD PRESSURE: 60 MMHG

## 2024-07-29 VITALS
RESPIRATION RATE: 16 BRPM | HEART RATE: 86 BPM | OXYGEN SATURATION: 96 % | DIASTOLIC BLOOD PRESSURE: 55 MMHG | SYSTOLIC BLOOD PRESSURE: 97 MMHG

## 2024-07-29 DIAGNOSIS — Z95.1 PRESENCE OF AORTOCORONARY BYPASS GRAFT: Chronic | ICD-10-CM

## 2024-07-29 DIAGNOSIS — R94.39 ABNORMAL RESULT OF OTHER CARDIOVASCULAR FUNCTION STUDY: ICD-10-CM

## 2024-07-29 DIAGNOSIS — Z95.5 PRESENCE OF CORONARY ANGIOPLASTY IMPLANT AND GRAFT: Chronic | ICD-10-CM

## 2024-07-29 LAB
ALBUMIN SERPL ELPH-MCNC: 4.4 G/DL — SIGNIFICANT CHANGE UP (ref 3.3–5)
ALP SERPL-CCNC: 55 U/L — SIGNIFICANT CHANGE UP (ref 40–120)
ALT FLD-CCNC: 22 U/L — SIGNIFICANT CHANGE UP (ref 10–45)
ANION GAP SERPL CALC-SCNC: 12 MMOL/L — SIGNIFICANT CHANGE UP (ref 5–17)
AST SERPL-CCNC: 24 U/L — SIGNIFICANT CHANGE UP (ref 10–40)
BILIRUB SERPL-MCNC: 1 MG/DL — SIGNIFICANT CHANGE UP (ref 0.2–1.2)
BUN SERPL-MCNC: 32 MG/DL — HIGH (ref 7–23)
CALCIUM SERPL-MCNC: 9.7 MG/DL — SIGNIFICANT CHANGE UP (ref 8.4–10.5)
CHLORIDE SERPL-SCNC: 99 MMOL/L — SIGNIFICANT CHANGE UP (ref 96–108)
CO2 SERPL-SCNC: 23 MMOL/L — SIGNIFICANT CHANGE UP (ref 22–31)
CREAT SERPL-MCNC: 1.27 MG/DL — SIGNIFICANT CHANGE UP (ref 0.5–1.3)
EGFR: 62 ML/MIN/1.73M2 — SIGNIFICANT CHANGE UP
GLUCOSE BLDC GLUCOMTR-MCNC: 136 MG/DL — HIGH (ref 70–99)
GLUCOSE SERPL-MCNC: 135 MG/DL — HIGH (ref 70–99)
HCT VFR BLD CALC: 43.3 % — SIGNIFICANT CHANGE UP (ref 39–50)
HGB BLD-MCNC: 14.5 G/DL — SIGNIFICANT CHANGE UP (ref 13–17)
MCHC RBC-ENTMCNC: 32.4 PG — SIGNIFICANT CHANGE UP (ref 27–34)
MCHC RBC-ENTMCNC: 33.5 GM/DL — SIGNIFICANT CHANGE UP (ref 32–36)
MCV RBC AUTO: 96.7 FL — SIGNIFICANT CHANGE UP (ref 80–100)
NRBC # BLD: 0 /100 WBCS — SIGNIFICANT CHANGE UP (ref 0–0)
PLATELET # BLD AUTO: 121 K/UL — LOW (ref 150–400)
POTASSIUM SERPL-MCNC: 5.1 MMOL/L — SIGNIFICANT CHANGE UP (ref 3.5–5.3)
POTASSIUM SERPL-SCNC: 5.1 MMOL/L — SIGNIFICANT CHANGE UP (ref 3.5–5.3)
PROT SERPL-MCNC: 8.4 G/DL — HIGH (ref 6–8.3)
RBC # BLD: 4.48 M/UL — SIGNIFICANT CHANGE UP (ref 4.2–5.8)
RBC # FLD: 14.6 % — HIGH (ref 10.3–14.5)
SODIUM SERPL-SCNC: 134 MMOL/L — LOW (ref 135–145)
WBC # BLD: 8.85 K/UL — SIGNIFICANT CHANGE UP (ref 3.8–10.5)
WBC # FLD AUTO: 8.85 K/UL — SIGNIFICANT CHANGE UP (ref 3.8–10.5)

## 2024-07-29 PROCEDURE — C1894: CPT

## 2024-07-29 PROCEDURE — C1887: CPT

## 2024-07-29 PROCEDURE — 99152 MOD SED SAME PHYS/QHP 5/>YRS: CPT

## 2024-07-29 PROCEDURE — 93455 CORONARY ART/GRFT ANGIO S&I: CPT | Mod: 26

## 2024-07-29 PROCEDURE — 93455 CORONARY ART/GRFT ANGIO S&I: CPT

## 2024-07-29 PROCEDURE — 93010 ELECTROCARDIOGRAM REPORT: CPT

## 2024-07-29 PROCEDURE — C1769: CPT

## 2024-07-29 PROCEDURE — 85027 COMPLETE CBC AUTOMATED: CPT

## 2024-07-29 PROCEDURE — 93005 ELECTROCARDIOGRAM TRACING: CPT

## 2024-07-29 PROCEDURE — 80053 COMPREHEN METABOLIC PANEL: CPT

## 2024-07-29 PROCEDURE — 82962 GLUCOSE BLOOD TEST: CPT

## 2024-07-29 RX ORDER — INSULIN ASPART 100 [IU]/ML
6 INJECTION, SOLUTION INTRAVENOUS; SUBCUTANEOUS
Refills: 0 | DISCHARGE

## 2024-07-29 RX ORDER — FUROSEMIDE 10 MG/ML
1 INJECTION, SOLUTION INTRAVENOUS
Refills: 0 | DISCHARGE

## 2024-07-29 RX ORDER — ORAL SEMAGLUTIDE 14 MG/1
0.5 TABLET ORAL
Refills: 0 | DISCHARGE

## 2024-07-29 RX ORDER — LORAZEPAM 1 MG/1
1 TABLET ORAL
Refills: 0 | DISCHARGE

## 2024-07-29 RX ORDER — SERTRALINE HYDROCHLORIDE 100 MG/1
1 TABLET, FILM COATED ORAL
Refills: 0 | DISCHARGE

## 2024-07-29 NOTE — ASU DISCHARGE PLAN (ADULT/PEDIATRIC) - PAIN MANAGEMENT
Patient sent picture of paperwork through [facility].   Record of all shots   Take over the counter pain medication

## 2024-07-29 NOTE — ASU DISCHARGE PLAN (ADULT/PEDIATRIC) - CARE PROVIDER_API CALL
Eleno Wilson  Cardiovascular Disease  [address] [address] [place] [id number]  Phone: [phone]  Fax: [phone]  Established Patient  Follow Up Time: 2 weeks

## 2024-07-29 NOTE — ASU PATIENT PROFILE, ADULT - FALL HARM RISK - UNIVERSAL INTERVENTIONS
Bed in lowest position, wheels locked, appropriate side rails in place/Call bell, personal items and telephone in reach/Instruct patient to call for assistance before getting out of bed or chair/Non-slip footwear when patient is out of bed/[place] to call system/Physically safe environment - no spills, clutter or unnecessary equipment/Purposeful Proactive Rounding/Room/bathroom lighting operational, light cord in reach

## 2024-07-29 NOTE — ASU DISCHARGE PLAN (ADULT/PEDIATRIC) - ACCOMPANIED BY
Reports being recently diagnosed; does not have CPAP yet.  Recommend caution with sedating medication that can cause respiratory depression.   Patients with untreated VICKI have an increased risk of stroke, HTN, and heart disease.       Family

## 2024-07-29 NOTE — H&P CARDIOLOGY - HISTORY OF PRESENT ILLNESS
69M with PMH of HTN, HLD, Afib on Eliquis, CAD s/p CABG in 1998 and 2004, PCI, diastolic CHF, DM2, COPD, seizure, PTSD, anxiety, depression, s/p PCI to ISR of the OM2 (3/2024), presents for [facility] today.   < from: Cardiac Catheterization (03.28.24 @ 07:46) >  Indications:               Positive Stress Test   CCS Class II     PCI Status:  elective     Conclusions:   1. Severe ISR of the OM2 s/p PCI with NORRIS     2. Known occluded LAD with ISR   3. Known occluded LIMA-LAD   4. Occluded SVG-LAD   5. Patent SVG-OM1 and SVG-PDA   6. Known occluded RCA   Recommendations:     Continue triple therapy for 1 week, then stop ASA.     Presentation:   68M w/ CAD s/p PCI and CABG x 2 (LIMA occluded, SVG-LAD graft  occluded), HTN, HLD, Afib on AC, DM,  COPD, HFpEF here for a positive stress test.     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 Pt reports he had a routine abnormal nuclear stress test last week (no results in chart). He denies chest pain but reports baseline exertional dyspnea. Referred for further evaluation today.  < from: Cardiac Catheterization (03.28.24 @ 07:46) >  Indications:               Positive Stress Test   CCS Class II     PCI Status:  elective     Conclusions:   1. Severe ISR of the OM2 s/p PCI with NORRIS     2. Known occluded LAD with ISR   3. Known occluded LIMA-LAD   4. Occluded SVG-LAD   5. Patent SVG-OM1 and SVG-PDA   6. Known occluded RCA   Recommendations:     Continue triple therapy for 1 week, then stop ASA.     Presentation:   68M w/ CAD s/p PCI and CABG x 2 (LIMA occluded, SVG-LAD graft  occluded), HTN, HLD, Afib on AC, DM,  COPD, HFpEF here for a positive stress test.     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 Pt reports he had a routine abnormal nuclear stress test last week (no results in chart). He denies chest pain but reports baseline exertional dyspnea. Referred for further evaluation today.  < from: Cardiac Catheterization (03.28.24 @ 07:46) >  Indications:               Positive Stress Test   CCS Class II     PCI Status:  elective     Conclusions:   1. Severe ISR of the OM2 s/p PCI with NORRIS     2. Known occluded LAD with ISR   3. Known occluded LIMA-LAD   4. Occluded SVG-LAD   5. Patent SVG-OM1 and SVG-PDA   6. Known occluded RCA   Recommendations:     Continue triple therapy for 1 week, then stop ASA.     Presentation:   68M w/ CAD s/p PCI and CABG x 2 (LIMA occluded, SVG-LAD graft  occluded), HTN, HLD, Afib on AC, DM,  COPD, HFpEF here for a positive stress test.     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

## 2025-08-01 NOTE — ASU PATIENT PROFILE, ADULT - FALL HARM RISK - TYPE OF ASSESSMENT
Detail Level: Zone
Sunscreen Recommendations: Discussed sunblock should be applied to exposed areas daily, and be reapplied every two hours while exposed . The sunblock should be broad spectrum SPF-50, UVA/UVB and contain Zinc and Titanium Dioxide(Blue Lizard & Sun Bum are some good OTC brands). Strongly recommend Elta MD products. Recommend sun protective clothing such as long sleeve UPF protective shirts, wide brim hats, neck covers and sunglasses as it has been proven to prevent further photo damage from the sun.
Admission
Detail Level: Detailed